# Patient Record
Sex: MALE | Race: BLACK OR AFRICAN AMERICAN | NOT HISPANIC OR LATINO | Employment: OTHER | URBAN - METROPOLITAN AREA
[De-identification: names, ages, dates, MRNs, and addresses within clinical notes are randomized per-mention and may not be internally consistent; named-entity substitution may affect disease eponyms.]

---

## 2017-03-19 ENCOUNTER — HOSPITAL ENCOUNTER (EMERGENCY)
Facility: OTHER | Age: 32
Discharge: HOME | End: 2017-03-20
Attending: EMERGENCY MEDICINE
Payer: COMMERCIAL

## 2017-03-19 DIAGNOSIS — M62.838 MUSCLE SPASMS OF NECK: ICD-10-CM
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
## 2017-03-19 DIAGNOSIS — T14.8XXA ABRASION: ICD-10-CM

## 2017-03-19 DIAGNOSIS — M62.830 MUSCLE SPASM OF BACK: Primary | ICD-10-CM

## 2017-03-19 PROCEDURE — 96372 THER/PROPH/DIAG INJ SC/IM: CPT

## 2017-03-19 PROCEDURE — 63600175 PHARM REV CODE 636 W HCPCS: Performed by: PHYSICIAN ASSISTANT

## 2017-03-19 PROCEDURE — 25000003 PHARM REV CODE 250: Performed by: PHYSICIAN ASSISTANT

## 2017-03-19 PROCEDURE — 99283 EMERGENCY DEPT VISIT LOW MDM: CPT | Mod: 25

## 2017-03-19 RX ORDER — KETOROLAC TROMETHAMINE 30 MG/ML
30 INJECTION, SOLUTION INTRAMUSCULAR; INTRAVENOUS
Status: COMPLETED | OUTPATIENT
Start: 2017-03-19 | End: 2017-03-19

## 2017-03-19 RX ORDER — DIAZEPAM 5 MG/1
5 TABLET ORAL EVERY 6 HOURS PRN
Qty: 20 TABLET | Refills: 0 | Status: SHIPPED | OUTPATIENT
Start: 2017-03-19 | End: 2017-05-05

## 2017-03-19 RX ORDER — DICLOFENAC SODIUM 25 MG/1
25 TABLET, DELAYED RELEASE ORAL 3 TIMES DAILY PRN
Qty: 30 TABLET | Refills: 0 | Status: SHIPPED | OUTPATIENT
Start: 2017-03-19 | End: 2017-05-05

## 2017-03-19 RX ORDER — DIAZEPAM 5 MG/1
5 TABLET ORAL
Status: COMPLETED | OUTPATIENT
Start: 2017-03-19 | End: 2017-03-19

## 2017-03-19 RX ADMIN — DIAZEPAM 5 MG: 5 TABLET ORAL at 11:03

## 2017-03-19 RX ADMIN — KETOROLAC TROMETHAMINE 30 MG: 30 INJECTION, SOLUTION INTRAMUSCULAR at 11:03
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
## 2017-03-20 VITALS
OXYGEN SATURATION: 97 % | TEMPERATURE: 98 F | DIASTOLIC BLOOD PRESSURE: 68 MMHG | HEIGHT: 70 IN | RESPIRATION RATE: 17 BRPM | HEART RATE: 74 BPM | SYSTOLIC BLOOD PRESSURE: 111 MMHG | WEIGHT: 230 LBS | BODY MASS INDEX: 32.93 KG/M2
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
## 2017-05-05 ENCOUNTER — HOSPITAL ENCOUNTER (EMERGENCY)
Facility: OTHER | Age: 32
Discharge: HOME | End: 2017-05-05
Attending: EMERGENCY MEDICINE
Payer: COMMERCIAL

## 2017-05-05 VITALS
HEART RATE: 75 BPM | OXYGEN SATURATION: 98 % | BODY MASS INDEX: 29.86 KG/M2 | SYSTOLIC BLOOD PRESSURE: 139 MMHG | RESPIRATION RATE: 14 BRPM | TEMPERATURE: 98 F | HEIGHT: 68 IN | DIASTOLIC BLOOD PRESSURE: 71 MMHG | WEIGHT: 197 LBS

## 2017-05-05 DIAGNOSIS — R10.84 GENERALIZED ABDOMINAL PAIN: ICD-10-CM

## 2017-05-05 DIAGNOSIS — R19.7 NAUSEA VOMITING AND DIARRHEA: Primary | ICD-10-CM

## 2017-05-05 DIAGNOSIS — R11.2 NAUSEA VOMITING AND DIARRHEA: Primary | ICD-10-CM

## 2017-05-05 LAB
ALBUMIN SERPL BCP-MCNC: 4.2 G/DL
ALP SERPL-CCNC: 59 U/L
ALT SERPL W/O P-5'-P-CCNC: 45 U/L
ANION GAP SERPL CALC-SCNC: 8 MMOL/L
AST SERPL-CCNC: 31 U/L
BACTERIA #/AREA URNS HPF: ABNORMAL /HPF
BASOPHILS # BLD AUTO: 0.01 K/UL
BASOPHILS NFR BLD: 0.1 %
BILIRUB SERPL-MCNC: 0.7 MG/DL
BILIRUB UR QL STRIP: ABNORMAL
BUN SERPL-MCNC: 9 MG/DL
CALCIUM SERPL-MCNC: 9.1 MG/DL
CHLORIDE SERPL-SCNC: 104 MMOL/L
CLARITY UR: CLEAR
CO2 SERPL-SCNC: 28 MMOL/L
COLOR UR: ABNORMAL
CREAT SERPL-MCNC: 0.9 MG/DL
DIFFERENTIAL METHOD: ABNORMAL
EOSINOPHIL # BLD AUTO: 0 K/UL
EOSINOPHIL NFR BLD: 0.1 %
ERYTHROCYTE [DISTWIDTH] IN BLOOD BY AUTOMATED COUNT: 13.7 %
EST. GFR  (AFRICAN AMERICAN): >60 ML/MIN/1.73 M^2
EST. GFR  (NON AFRICAN AMERICAN): >60 ML/MIN/1.73 M^2
GLUCOSE SERPL-MCNC: 136 MG/DL
GLUCOSE UR QL STRIP: NEGATIVE
HCT VFR BLD AUTO: 50 %
HGB BLD-MCNC: 16.9 G/DL
HGB UR QL STRIP: NEGATIVE
HYALINE CASTS #/AREA URNS LPF: 1 /LPF
KETONES UR QL STRIP: ABNORMAL
LEUKOCYTE ESTERASE UR QL STRIP: NEGATIVE
LIPASE SERPL-CCNC: 11 U/L
LYMPHOCYTES # BLD AUTO: 1.5 K/UL
LYMPHOCYTES NFR BLD: 19.6 %
MCH RBC QN AUTO: 31.8 PG
MCHC RBC AUTO-ENTMCNC: 33.8 %
MCV RBC AUTO: 94 FL
MICROSCOPIC COMMENT: ABNORMAL
MONOCYTES # BLD AUTO: 0.5 K/UL
MONOCYTES NFR BLD: 6 %
NEUTROPHILS # BLD AUTO: 5.6 K/UL
NEUTROPHILS NFR BLD: 74.1 %
NITRITE UR QL STRIP: NEGATIVE
PH UR STRIP: 6 [PH] (ref 5–8)
PLATELET # BLD AUTO: 130 K/UL
PMV BLD AUTO: 11.9 FL
POTASSIUM SERPL-SCNC: 3.8 MMOL/L
PROT SERPL-MCNC: 7.5 G/DL
PROT UR QL STRIP: ABNORMAL
RBC # BLD AUTO: 5.31 M/UL
RBC #/AREA URNS HPF: 2 /HPF (ref 0–4)
SODIUM SERPL-SCNC: 140 MMOL/L
SP GR UR STRIP: >=1.03 (ref 1–1.03)
SQUAMOUS #/AREA URNS HPF: 2 /HPF
URN SPEC COLLECT METH UR: ABNORMAL
UROBILINOGEN UR STRIP-ACNC: ABNORMAL EU/DL
WBC # BLD AUTO: 7.62 K/UL
WBC #/AREA URNS HPF: 5 /HPF (ref 0–5)
WBC CASTS #/AREA URNS LPF: 1 /LPF

## 2017-05-05 PROCEDURE — 85025 COMPLETE CBC W/AUTO DIFF WBC: CPT

## 2017-05-05 PROCEDURE — 63600175 PHARM REV CODE 636 W HCPCS: Performed by: PHYSICIAN ASSISTANT

## 2017-05-05 PROCEDURE — 96374 THER/PROPH/DIAG INJ IV PUSH: CPT

## 2017-05-05 PROCEDURE — 80053 COMPREHEN METABOLIC PANEL: CPT

## 2017-05-05 PROCEDURE — 81000 URINALYSIS NONAUTO W/SCOPE: CPT

## 2017-05-05 PROCEDURE — 83690 ASSAY OF LIPASE: CPT

## 2017-05-05 PROCEDURE — 96375 TX/PRO/DX INJ NEW DRUG ADDON: CPT

## 2017-05-05 PROCEDURE — 99284 EMERGENCY DEPT VISIT MOD MDM: CPT | Mod: 25

## 2017-05-05 PROCEDURE — 25000003 PHARM REV CODE 250: Performed by: PHYSICIAN ASSISTANT

## 2017-05-05 RX ORDER — FAMOTIDINE 10 MG/ML
20 INJECTION INTRAVENOUS
Status: COMPLETED | OUTPATIENT
Start: 2017-05-05 | End: 2017-05-05

## 2017-05-05 RX ORDER — ONDANSETRON 2 MG/ML
4 INJECTION INTRAMUSCULAR; INTRAVENOUS
Status: COMPLETED | OUTPATIENT
Start: 2017-05-05 | End: 2017-05-05

## 2017-05-05 RX ORDER — FAMOTIDINE 20 MG/1
20 TABLET, FILM COATED ORAL 2 TIMES DAILY
Qty: 20 TABLET | Refills: 0 | Status: SHIPPED | OUTPATIENT
Start: 2017-05-05 | End: 2017-05-17

## 2017-05-05 RX ORDER — ONDANSETRON 4 MG/1
4 TABLET, ORALLY DISINTEGRATING ORAL EVERY 8 HOURS PRN
Qty: 20 TABLET | Refills: 0 | Status: SHIPPED | OUTPATIENT
Start: 2017-05-05 | End: 2017-05-17

## 2017-05-05 RX ADMIN — FAMOTIDINE 20 MG: 10 INJECTION, SOLUTION INTRAVENOUS at 03:05

## 2017-05-05 RX ADMIN — ONDANSETRON 4 MG: 2 INJECTION INTRAMUSCULAR; INTRAVENOUS at 03:05

## 2017-05-05 RX ADMIN — SODIUM CHLORIDE 1000 ML: 0.9 INJECTION, SOLUTION INTRAVENOUS at 03:05

## 2017-05-05 NOTE — ED AVS SNAPSHOT
[facility]  [address]  [facility] [address]               Ignacio Su   2017  2:37 PM   ED    Description:  Male : 1985   Department:  [facility]           Your Care was Coordinated By:     Provider Role From To    Brigido Peterson MD Attending Provider 17 0937 --    Demi Gutierrez PA-C Physician Assistant 17 4504 --      Reason for Visit     Abdominal Pain           Diagnoses this Visit        Comments    Nausea vomiting and diarrhea    -  Primary     Generalized abdominal pain           ED Disposition     None           To Do List           Follow-up Information     Follow up with [facility] - Internal [facility] In 3 days.    Specialty:  Internal Medicine    Contact information:    [address]  [facility] [address] [phone]    Additional information:    [facility], 8th Floor, [address]   Please park in [facility] Parking Garage.       These Medications        Disp Refills Start End    ondansetron (ZOFRAN-ODT) 4 MG TbDL 20 tablet 0 2017     Take 1 tablet (4 mg total) by mouth every 8 (eight) hours as needed (nausea). - Oral    famotidine (PEPCID) 20 MG tablet 20 tablet 0 2017    Take 1 tablet (20 mg total) by mouth 2 (two) times daily. - Oral      [facility]     [facility] Care [facility] - 24/7 Assistance  Unless otherwise directed by your provider, please contact [facility], our nurse care line that is available for 24/7 assistance.     Registered nurses in the [facility] provide: appointment scheduling, clinical advisement, health education, and other advisory services.  Call: [phone] (toll free)               Medications           Message regarding Medications     Verify the changes and/or additions to your medication regime listed below are the same as discussed with your clinician today.  If any of these changes or additions are  incorrect, please notify your healthcare provider.        START taking these NEW medications        Refills    ondansetron (ZOFRAN-ODT) 4 MG TbDL 0    Sig: Take 1 tablet (4 mg total) by mouth every 8 (eight) hours as needed (nausea).    Class: Print    Route: Oral    famotidine (PEPCID) 20 MG tablet 0    Sig: Take 1 tablet (20 mg total) by mouth 2 (two) times daily.    Class: Print    Route: Oral      These medications were administered today        Dose Freq    sodium chloride 0.9% bolus 1,000 mL 1,000 mL ED 1 Time    Sig: Inject 1,000 mLs into the vein ED 1 Time.    Class: Normal    Route: Intravenous    famotidine (PF) 20 mg/2 mL injection 20 mg 20 mg ED 1 Time    Sig: Inject 2 mLs (20 mg total) into the vein ED 1 Time.    Class: Normal    Route: Intravenous    ondansetron injection 4 mg 4 mg ED 1 Time    Sig: Inject 4 mg into the vein ED 1 Time.    Class: Normal    Route: Intravenous      STOP taking these medications     diazePAM (VALIUM) 5 MG tablet Take 1 tablet (5 mg total) by mouth every 6 (six) hours as needed (neck and back pain).    diclofenac (VOLTAREN) 25 MG TbEC Take 1 tablet (25 mg total) by mouth 3 (three) times daily as needed (pain).           Verify that the below list of medications is an accurate representation of the medications you are currently taking.  If none reported, the list may be blank. If incorrect, please contact your healthcare provider. Carry this list with you in case of emergency.           Current Medications     famotidine (PEPCID) 20 MG tablet Take 1 tablet (20 mg total) by mouth 2 (two) times daily.    ondansetron (ZOFRAN-ODT) 4 MG TbDL Take 1 tablet (4 mg total) by mouth every 8 (eight) hours as needed (nausea).    ranitidine (ZANTAC) 150 MG capsule Take 150 mg by mouth 2 (two) times daily.           Clinical Reference Information           Your Vitals Were     BP Pulse Temp Resp Height Weight    114/69 (BP Location: Left arm, Patient Position: Sitting) 72 97.5 °F (36.4 °C)  "(Oral) 14 5' 8" (1.727 m) 89.4 kg (197 lb)    SpO2 BMI             96% 29.95 kg/m2         Allergies as of 5/5/2017     No Known Allergies      Immunizations Administered on Date of Encounter - 5/5/2017     None      ED Micro, Lab, POCT     Start Ordered       Status Ordering Provider    05/05/17 1444 05/05/17 1443  CBC auto differential  STAT      Final result     05/05/17 1444 05/05/17 1443  Comprehensive metabolic panel  STAT      Final result     05/05/17 1444 05/05/17 1443  Lipase  STAT      Final result     05/05/17 1444 05/05/17 1443  Urinalysis  STAT      Final result     05/05/17 1443 05/05/17 1443  Urinalysis Microscopic  Once      Final result       ED Imaging Orders     None      Discharge References/Attachments     ABDOMINAL PAIN, ADULT (ENGLISH)    NAUSEA AND VOMITING, HOW TO CONTROL (ENGLISH)    VOMITING AND DIARRHEA, SELF-CARE FOR (ENGLISH)      [facility] Sign-Up     Activating your [facility] account is as easy as 1-2-3!     1) Visit [email or web address], select Sign Up Now, enter this activation code and your date of birth, then select Next.  [email or web address]  Expires: 6/19/2017  4:02 PM      2) Create a username and password to use when you visit [facility] in the future and select a security question in case you lose your password and select Next.    3) Enter your e-mail address and click Sign Up!    Additional Information  If you have questions, please e-mail [email or web address] or call [phone] to talk to our [facility] staff. Remember, [facility] is NOT to be used for urgent needs. For medical emergencies, dial 911.          [facility] complies with applicable Federal civil rights laws and does not discriminate on the basis of race, color, national origin, age, disability, or sex.        Language Assistance Services     ATTENTION: Language assistance services are available, free of charge. Please call [phone].      ATENCIÓN: Si habla español, tiene a banuelos disposición " servicios gratuitos de asistencia lingüística. Eleni al [phone].     JAYNE Ý: N?u b?n nói Ti?ng Vi?t, có các d?ch v? h? tr? ngôn ng? mi?n phí dành cho b?n. G?i s? [phone].

## 2017-05-05 NOTE — ED TRIAGE NOTES
"Pt c/o constant sharp mid quadrant abdominal pain. Pt states "It feel like somebody twistin my insides." Pt c/o N/V/D X 3 days. Pt unable to hold down sips of fluid.   "

## 2017-05-05 NOTE — ED PROVIDER NOTES
"Encounter Date: 5/5/2017       History     Chief Complaint   Patient presents with    Abdominal Pain     Patient c/o generalized abdominal pain with nausea, vomiting, diarrhea and fatigue for several days.  Patient stated, "Anything I eat or drink I throw up and im so hungry but I cant."       Review of patient's allergies indicates:  No Known Allergies  HPI Comments: 31-year-old male with GERD and internal hemorrhoids presents to the emergency department with complaints of nausea, vomiting, diarrhea and abdominal pain.  He states his symptoms and present for last 3 days.  He reports multiple episodes of emesis and diarrhea.  He states that he now feels fatigued.  He complains of 10 out of 10 pain that is cramping in nature.  No current treatment.  He does admit to working at [employer] and has been exposed to multiple sick students    The history is provided by the patient and the spouse.     Past Medical History:   Diagnosis Date    GERD (gastroesophageal reflux disease)     Internal hemorrhoids      History reviewed. No pertinent surgical history.  Family History   Problem Relation Age of Onset    Heart disease Mother     Heart disease Father      Social History   Substance Use Topics    Smoking status: Never Smoker    Smokeless tobacco: None    Alcohol use Yes     Review of Systems   Constitutional: Positive for fatigue. Negative for chills and fever.   HENT: Negative for sore throat.    Respiratory: Negative for shortness of breath.    Cardiovascular: Negative for chest pain.   Gastrointestinal: Positive for abdominal pain, diarrhea, nausea and vomiting.   Genitourinary: Negative for difficulty urinating, dysuria, flank pain, frequency and urgency.   Musculoskeletal: Negative for back pain.   Skin: Negative for rash.   Neurological: Negative for weakness.   Hematological: Does not bruise/bleed easily.       Physical Exam   Initial Vitals   BP Pulse Resp Temp SpO2   05/05/17 1434 05/05/17 1434 05/05/17 1434 " 05/05/17 1434 05/05/17 1434   114/69 72 14 97.5 °F (36.4 °C) 96 %     Physical Exam    Nursing note and vitals reviewed.  Constitutional: Vital signs are normal. He appears well-developed and well-nourished. He is not diaphoretic.  Non-toxic appearance. No distress.   HENT:   Head: Normocephalic and atraumatic.   Right Ear: External ear normal.   Left Ear: External ear normal.   Nose: Nose normal.   Mouth/Throat: Uvula is midline and oropharynx is clear and moist. Mucous membranes are dry. No trismus in the jaw. No uvula swelling. No oropharyngeal exudate.   Eyes: Conjunctivae, EOM and lids are normal. Pupils are equal, round, and reactive to light. No scleral icterus.   Neck: Normal range of motion and phonation normal. Neck supple.   Cardiovascular: Normal rate, regular rhythm and normal heart sounds. Exam reveals no gallop and no friction rub.    No murmur heard.  Pulmonary/Chest: Effort normal and breath sounds normal. No respiratory distress. He has no decreased breath sounds. He has no wheezes. He has no rhonchi. He has no rales.   Abdominal: Soft. Normal appearance and bowel sounds are normal. There is generalized tenderness. There is no rebound and no guarding.   Musculoskeletal: Normal range of motion.   No obvious deformities, moving all extremities, normal gait   Neurological: He is alert and oriented to person, place, and time. He has normal strength and normal reflexes. No sensory deficit.   Skin: Skin is warm, dry and intact. No lesion and no rash noted. No erythema.   Psychiatric: He has a normal mood and affect. His speech is normal and behavior is normal. Judgment normal. Cognition and memory are normal.         ED Course   Procedures  Labs Reviewed   CBC W/ AUTO DIFFERENTIAL - Abnormal; Notable for the following:        Result Value    MCH 31.8 (*)     Platelets 130 (*)     Gran% 74.1 (*)     All other components within normal limits   COMPREHENSIVE METABOLIC PANEL - Abnormal; Notable for the  following:     Glucose 136 (*)     ALT 45 (*)     All other components within normal limits   URINALYSIS - Abnormal; Notable for the following:     Specific Gravity, UA >=1.030 (*)     Protein, UA 1+ (*)     Ketones, UA Trace (*)     Bilirubin (UA) 1+ (*)     Urobilinogen, UA 2.0-3.0 (*)     All other components within normal limits   URINALYSIS MICROSCOPIC - Abnormal; Notable for the following:     WBC Casts, UA 1 (*)     All other components within normal limits   LIPASE             Medical Decision Making:   History:   I obtained history from: someone other than patient.       <> Summary of History: spouse  Old Medical Records: I decided to obtain old medical records.  Initial Assessment:   31-year-old male with complaints consistent nausea, vomiting and diarrhea with associated abdominal pain likely secondary to viral gastroenteritis.  Vital signs stable, afebrile, neurovascularly intact.  He is alert, healthy and nontoxic appearing.  He is in no apparent distress.  Exam documented above.  Clinical Tests:   Lab Tests: Ordered and Reviewed  The following lab test(s) were unremarkable: CBC, CMP, Lipase and Urinalysis  ED Management:  Urine and labs were obtained.  No significant abnormality to explain patient's symptoms.  Do believe that his viral in nature.  He was administered IV fluids, Zofran and Pepcid in the emergency department.3:59 PM patient states he is feeling much improved.  Tolerating by mouth water.  We'll discharged home with prescriptions for symptomatic treatment and care instructions.  He is to follow-up with his primary care physician in the next 48 hours or return for any worsening symptoms.  He states understanding.  This patient was discussed with the attending physician who agrees with treatment plan.   Other:   I have discussed this case with another health care provider.       <> Summary of the Discussion: Nicholas  This note was created using Dragon Medical dictation.  There may be  typographical errors secondary to dictation.                     ED Course     Clinical Impression:     1. Nausea vomiting and diarrhea    2. Generalized abdominal pain        Disposition:   Disposition: Discharged  Condition: Stable       Demi Gutierrez PA-C  05/05/17 1600

## 2017-05-05 NOTE — ED NOTES
Patient Identifiers for Ignacio Su checked and correct  LOC: The patient is awake, alert and aware of environment with an appropriate affect, the patient is oriented x 3 and speaking appropriate.  APPEARANCE: Patient resting comfortably and in no acute distress. The patient is clean and well groomed. The patient's clothing is properly fastened.  SKIN: The skin is warm and dry. The patient has normal skin turgor and dry mucus membranes.   Musculoskeletal :  Normal range of motion noted. Moves all extremities well.  RESPIRATORY: Airway is open and patent, respirations are spontaneous, patient has a normal effort and rate. Breath sounds are clear & equal, bilaterally.  CARDIAC: Patient has a normal rate and rhythm, no peripheral edema noted, capillary refill < 3 seconds.   ABDOMEN: Soft and tender to palpation in the epigastrium, RUQ & RLQ. Pain most severe upon palpation of the RLQ. no distention observed. Bowels Sounds are WNL all quads.  PULSES: 2+ radial pulses, symmetrical in all extremities.        Will continue to monitor

## 2017-05-17 ENCOUNTER — HOSPITAL ENCOUNTER (EMERGENCY)
Facility: OTHER | Age: 32
Discharge: HOME | End: 2017-05-17
Attending: EMERGENCY MEDICINE
Payer: COMMERCIAL

## 2017-05-17 VITALS
OXYGEN SATURATION: 97 % | HEIGHT: 71 IN | HEART RATE: 86 BPM | RESPIRATION RATE: 18 BRPM | SYSTOLIC BLOOD PRESSURE: 132 MMHG | WEIGHT: 200 LBS | DIASTOLIC BLOOD PRESSURE: 69 MMHG | TEMPERATURE: 98 F | BODY MASS INDEX: 28 KG/M2

## 2017-05-17 DIAGNOSIS — H10.31 ACUTE CONJUNCTIVITIS OF RIGHT EYE, UNSPECIFIED ACUTE CONJUNCTIVITIS TYPE: Primary | ICD-10-CM

## 2017-05-17 PROCEDURE — 99283 EMERGENCY DEPT VISIT LOW MDM: CPT

## 2017-05-17 RX ORDER — ERYTHROMYCIN 5 MG/G
OINTMENT OPHTHALMIC
Qty: 1 TUBE | Refills: 0 | Status: SHIPPED | OUTPATIENT
Start: 2017-05-17 | End: 2017-06-01

## 2017-05-17 NOTE — ED AVS SNAPSHOT
[facility]  [address] [facility] [facility] [address]               Ignacio Su   2017  9:26 AM   ED    Description:  Male : 1985   Department:  [facility]           Your Care was Coordinated By:     Provider Role From To    Adrien Servin MD Attending Provider 17 0932 --    Vangie Eldre PA-C Physician Assistant 1732 --      Reason for Visit     Conjunctivitis           Diagnoses this Visit        Comments    Acute conjunctivitis of right eye, unspecified acute conjunctivitis type    -  Primary       ED Disposition     None           To Do List           Follow-up Information     Follow up with [facility] Naomi Santiago In 2 days.    Contact information:    [address] [facility] [address] [phone]          Follow up with [facility].    Specialty:  Emergency Medicine    Why:  If symptoms worsen    Contact information:    [address] [facility] [address] [phone]       These Medications        Disp Refills Start End    erythromycin (ROMYCIN) ophthalmic ointment 1 Tube 0 2017     Place a 1/2 inch ribbon of ointment into the lower eyelid 4-5 times per day x7 days    Pharmacy: [facility] [address] [place] [address] AT [facility] #: [phone]         [facility]     [facility] Nurse Care Line - 24/ Assistance  Unless otherwise directed by your provider, please contact [facility], our nurse care line that is available for 24/7 assistance.     Registered nurses in the [facility] provide: appointment scheduling, clinical advisement, health education, and other advisory services.  Call: [phone] (toll free)               Medications           Message regarding Medications     Verify the changes and/or additions to your medication regime listed below are the same as  "discussed with your clinician today.  If any of these changes or additions are incorrect, please notify your healthcare provider.        START taking these NEW medications        Refills    erythromycin (ROMYCIN) ophthalmic ointment 0    Sig: Place a 1/2 inch ribbon of ointment into the lower eyelid 4-5 times per day x7 days    Class: Print      STOP taking these medications     ondansetron (ZOFRAN-ODT) 4 MG TbDL Take 1 tablet (4 mg total) by mouth every 8 (eight) hours as needed (nausea).    famotidine (PEPCID) 20 MG tablet Take 1 tablet (20 mg total) by mouth 2 (two) times daily.           Verify that the below list of medications is an accurate representation of the medications you are currently taking.  If none reported, the list may be blank. If incorrect, please contact your healthcare provider. Carry this list with you in case of emergency.           Current Medications     ranitidine (ZANTAC) 150 MG capsule Take 150 mg by mouth 2 (two) times daily.    erythromycin (ROMYCIN) ophthalmic ointment Place a 1/2 inch ribbon of ointment into the lower eyelid 4-5 times per day x7 days           Clinical Reference Information           Your Vitals Were     BP Pulse Temp Resp Height Weight    132/69 (BP Location: Left arm, Patient Position: Sitting) 86 97.9 °F (36.6 °C) (Oral) 18 5' 11" (1.803 m) 90.7 kg (200 lb)    SpO2 BMI             97% 27.89 kg/m2         Allergies as of 5/17/2017     No Known Allergies      Immunizations Administered on Date of Encounter - 5/17/2017     None      ED Micro, Lab, POCT     None      ED Imaging Orders     None      Discharge References/Attachments     CONJUNCTIVITIS, NON-SPECIFIC (ENGLISH)      [facility] Sign-Up     Activating your [facility] account is as easy as 1-2-3!     1) Visit [email or web address], select Sign Up Now, enter this activation code and your date of birth, then select Next.  [email or web address]  Expires: 6/19/2017  4:02 PM      2) Create a username and password to use when " you visit [facility] in the future and select a security question in case you lose your password and select Next.    3) Enter your e-mail address and click Sign Up!    Additional Information  If you have questions, please e-mail [email or web address] or call [phone] to talk to our [facility] staff. Remember, [facility] is NOT to be used for urgent needs. For medical emergencies, dial 911.          [facility] complies with applicable Federal civil rights laws and does not discriminate on the basis of race, color, national origin, age, disability, or sex.        Language Assistance Services     ATTENTION: Language assistance services are available, free of charge. Please call [phone].      ATENCIÓN: Si rosaliela xu, tiene a banuelos disposición servicios gratuitos de asistencia lingüística. Llame al [phone].     CHÚ Ý: N?u b?n nói Ti?ng Vi?t, có các d?ch v? h? tr? ngôn ng? mi?n phí dành cho b?n. G?i s? [phone].

## 2017-05-17 NOTE — ED PROVIDER NOTES
"Encounter Date: 5/17/2017       History     Chief Complaint   Patient presents with    Conjunctivitis     pt reports "It's the third day that I've woken up with my right eye crusted over." c/o right eye redness, swelling, drainage and crusting for the past 3 days     Review of patient's allergies indicates:  No Known Allergies  HPI Comments: Patient is a 31 y.o. male presenting to the emergency department with complaints of right eye pain and discharge.  The patient reports that for the past 3 days, he is woken with crusty discharge to his right eye.  He also reports 8/10 pain and itching.  He denies blurred vision or changes in his vision.  He denies other symptoms including fever, chills, nausea, vomiting.  He has not used any medication for symptoms thus far.  He denies previous episode.  He states he has not wear contacts.    The history is provided by the patient.     Past Medical History:   Diagnosis Date    GERD (gastroesophageal reflux disease)     Internal hemorrhoids      History reviewed. No pertinent surgical history.  Family History   Problem Relation Age of Onset    Heart disease Mother     Heart disease Father      Social History   Substance Use Topics    Smoking status: Never Smoker    Smokeless tobacco: None    Alcohol use Yes     Review of Systems   Constitutional: Negative for activity change, chills, fatigue and fever.   HENT: Negative for congestion, rhinorrhea and sore throat.    Eyes: Positive for discharge, redness and itching. Negative for photophobia and visual disturbance.   Respiratory: Negative for cough and shortness of breath.    Cardiovascular: Negative for chest pain.   Gastrointestinal: Negative for abdominal pain, diarrhea, nausea and vomiting.   Genitourinary: Negative for dysuria, hematuria and urgency.   Musculoskeletal: Negative for back pain, myalgias and neck pain.   Skin: Negative for color change and wound.   Neurological: Negative for weakness and headaches. "   Psychiatric/Behavioral: Negative for agitation and confusion.       Physical Exam   Initial Vitals   BP Pulse Resp Temp SpO2   05/17/17 0854 05/17/17 0854 05/17/17 0854 05/17/17 0854 05/17/17 0854   132/69 86 18 97.9 °F (36.6 °C) 97 %     Physical Exam    Nursing note and vitals reviewed.  Constitutional: Vital signs are normal. He appears well-developed and well-nourished. He is not diaphoretic. He is cooperative.  Non-toxic appearance. He does not have a sickly appearance. He does not appear ill. No distress.   Well appearing, -American male unaccompanied in the emergency department.  He is in no acute distress, laying comfortably on the exam table listening to music.   HENT:   Head: Normocephalic and atraumatic.   Right Ear: Hearing, tympanic membrane, external ear and ear canal normal.   Left Ear: Hearing, tympanic membrane, external ear and ear canal normal.   Nose: Nose normal.   Mouth/Throat: Oropharynx is clear and moist.   Eyes: EOM and lids are normal. Pupils are equal, round, and reactive to light.   Right-sided conjunctivitis with no active purulent drainage.  Normal extraocular movement.  PERRLA.  No edema or tenderness palpation of upper or lower lids.  No surrounding tenderness orbit.   Neck: Normal range of motion. Neck supple.   Cardiovascular: Normal rate, regular rhythm and normal heart sounds.   Pulmonary/Chest: Breath sounds normal. No respiratory distress. He has no wheezes.   Abdominal: Soft. Bowel sounds are normal. He exhibits no distension. There is no tenderness. There is no rebound and no guarding.   Musculoskeletal: Normal range of motion.   Neurological: He is alert and oriented to person, place, and time. GCS eye subscore is 4. GCS verbal subscore is 5. GCS motor subscore is 6.   Skin: Skin is warm.   Psychiatric: He has a normal mood and affect. His behavior is normal. Judgment and thought content normal.         ED Course   Procedures  Labs Reviewed - No data to display           Medical Decision Making:   Other:   I have discussed this case with another health care provider.       <> Summary of the Discussion: Dr. Bueno  This note was created using Dragon Medical Dictation. There may be typographical errors secondary to dictation.     Urgent evaluation of a 31 y.o. male presenting to the emergency department complaining of right eye drainage and pain. Patient is afebrile, nontoxic appearing and hemodynamically stable.  Physical exam reveals regular rate and rhythm, lungs are clear to auscultation bilaterally.  Right-sided conjunctivitis with no surrounding edema, erythema, or tenderness to palpation.  Normal extraocular movement.  Visual acuity is assessed at 20/20 in bilateral eyes as well as the left and right eye individually.  Given patient's history and physical exam findings, I will treat with erythromycin.  No further testing or imaging is warranted.  Patient was counseled on symptomatic care and treatment.  He stable for discharge. The patient was instructed to follow up with a primary care provider in 2 days or to return to the emergency department for worsening symptoms. The treatment plan was discussed with the patient who demonstrated understanding and comfort with plan. The patient's history, physical exam, and plan of care was discussed with and agreed upon with my supervising physician.     Past Medical History:   Diagnosis Date    GERD (gastroesophageal reflux disease)     Internal hemorrhoids                      ED Course     Clinical Impression:     1. Acute conjunctivitis of right eye, unspecified acute conjunctivitis type       Disposition:   Disposition: Discharged  Condition: Stable       Vangie Elder PA-C  05/17/17 0948

## 2017-06-01 ENCOUNTER — HOSPITAL ENCOUNTER (EMERGENCY)
Facility: OTHER | Age: 32
Discharge: HOME | End: 2017-06-01
Attending: EMERGENCY MEDICINE
Payer: COMMERCIAL

## 2017-06-01 VITALS
OXYGEN SATURATION: 96 % | RESPIRATION RATE: 20 BRPM | HEART RATE: 82 BPM | HEIGHT: 71 IN | DIASTOLIC BLOOD PRESSURE: 78 MMHG | BODY MASS INDEX: 28 KG/M2 | WEIGHT: 200 LBS | TEMPERATURE: 98 F | SYSTOLIC BLOOD PRESSURE: 137 MMHG

## 2017-06-01 DIAGNOSIS — K83.8 DILATED CBD, ACQUIRED: Primary | ICD-10-CM

## 2017-06-01 DIAGNOSIS — R74.8 ELEVATED LIPASE: ICD-10-CM

## 2017-06-01 DIAGNOSIS — R11.2 NAUSEA AND VOMITING, INTRACTABILITY OF VOMITING NOT SPECIFIED, UNSPECIFIED VOMITING TYPE: ICD-10-CM

## 2017-06-01 LAB
ALBUMIN SERPL BCP-MCNC: 3.5 G/DL
ALP SERPL-CCNC: 68 U/L
ALT SERPL W/O P-5'-P-CCNC: 50 U/L
ANION GAP SERPL CALC-SCNC: 7 MMOL/L
AST SERPL-CCNC: 36 U/L
BASOPHILS # BLD AUTO: 0.03 K/UL
BASOPHILS NFR BLD: 0.5 %
BILIRUB SERPL-MCNC: 0.3 MG/DL
BILIRUB UR QL STRIP: NEGATIVE
BUN SERPL-MCNC: 6 MG/DL
CALCIUM SERPL-MCNC: 8.7 MG/DL
CHLORIDE SERPL-SCNC: 111 MMOL/L
CLARITY UR: CLEAR
CO2 SERPL-SCNC: 25 MMOL/L
COLOR UR: YELLOW
CREAT SERPL-MCNC: 0.8 MG/DL
DIFFERENTIAL METHOD: ABNORMAL
EOSINOPHIL # BLD AUTO: 0 K/UL
EOSINOPHIL NFR BLD: 0.6 %
ERYTHROCYTE [DISTWIDTH] IN BLOOD BY AUTOMATED COUNT: 14.3 %
EST. GFR  (AFRICAN AMERICAN): >60 ML/MIN/1.73 M^2
EST. GFR  (NON AFRICAN AMERICAN): >60 ML/MIN/1.73 M^2
GLUCOSE SERPL-MCNC: 104 MG/DL
GLUCOSE UR QL STRIP: NEGATIVE
HCT VFR BLD AUTO: 45.3 %
HGB BLD-MCNC: 14.8 G/DL
HGB UR QL STRIP: NEGATIVE
KETONES UR QL STRIP: NEGATIVE
LEUKOCYTE ESTERASE UR QL STRIP: NEGATIVE
LIPASE SERPL-CCNC: 91 U/L
LYMPHOCYTES # BLD AUTO: 2.1 K/UL
LYMPHOCYTES NFR BLD: 32.9 %
MCH RBC QN AUTO: 31.4 PG
MCHC RBC AUTO-ENTMCNC: 32.7 %
MCV RBC AUTO: 96 FL
MONOCYTES # BLD AUTO: 0.6 K/UL
MONOCYTES NFR BLD: 9.1 %
NEUTROPHILS # BLD AUTO: 3.7 K/UL
NEUTROPHILS NFR BLD: 56.3 %
NITRITE UR QL STRIP: NEGATIVE
PH UR STRIP: 8 [PH] (ref 5–8)
PLATELET # BLD AUTO: 157 K/UL
PMV BLD AUTO: 10.8 FL
POTASSIUM SERPL-SCNC: 4.2 MMOL/L
PROT SERPL-MCNC: 6.3 G/DL
PROT UR QL STRIP: ABNORMAL
RBC # BLD AUTO: 4.72 M/UL
SODIUM SERPL-SCNC: 143 MMOL/L
SP GR UR STRIP: 1.01 (ref 1–1.03)
URN SPEC COLLECT METH UR: ABNORMAL
UROBILINOGEN UR STRIP-ACNC: 1 EU/DL
WBC # BLD AUTO: 6.5 K/UL

## 2017-06-01 PROCEDURE — 83690 ASSAY OF LIPASE: CPT

## 2017-06-01 PROCEDURE — 96375 TX/PRO/DX INJ NEW DRUG ADDON: CPT

## 2017-06-01 PROCEDURE — 96374 THER/PROPH/DIAG INJ IV PUSH: CPT

## 2017-06-01 PROCEDURE — 25500020 PHARM REV CODE 255: Performed by: EMERGENCY MEDICINE

## 2017-06-01 PROCEDURE — 25000003 PHARM REV CODE 250: Performed by: PHYSICIAN ASSISTANT

## 2017-06-01 PROCEDURE — 81003 URINALYSIS AUTO W/O SCOPE: CPT

## 2017-06-01 PROCEDURE — 85025 COMPLETE CBC W/AUTO DIFF WBC: CPT

## 2017-06-01 PROCEDURE — 63600175 PHARM REV CODE 636 W HCPCS: Performed by: PHYSICIAN ASSISTANT

## 2017-06-01 PROCEDURE — 80053 COMPREHEN METABOLIC PANEL: CPT

## 2017-06-01 PROCEDURE — 99284 EMERGENCY DEPT VISIT MOD MDM: CPT | Mod: 25

## 2017-06-01 PROCEDURE — 96361 HYDRATE IV INFUSION ADD-ON: CPT

## 2017-06-01 RX ORDER — SODIUM CHLORIDE 9 MG/ML
1000 INJECTION, SOLUTION INTRAVENOUS
Status: COMPLETED | OUTPATIENT
Start: 2017-06-01 | End: 2017-06-01

## 2017-06-01 RX ORDER — ONDANSETRON 4 MG/1
4 TABLET, ORALLY DISINTEGRATING ORAL EVERY 8 HOURS PRN
Qty: 12 TABLET | Refills: 0 | Status: SHIPPED | OUTPATIENT
Start: 2017-06-01 | End: 2017-09-07

## 2017-06-01 RX ORDER — ONDANSETRON 2 MG/ML
4 INJECTION INTRAMUSCULAR; INTRAVENOUS
Status: COMPLETED | OUTPATIENT
Start: 2017-06-01 | End: 2017-06-01

## 2017-06-01 RX ORDER — FAMOTIDINE 20 MG/1
20 TABLET, FILM COATED ORAL 2 TIMES DAILY
Qty: 20 TABLET | Refills: 0 | Status: SHIPPED | OUTPATIENT
Start: 2017-06-01 | End: 2018-06-15

## 2017-06-01 RX ORDER — IBUPROFEN 600 MG/1
600 TABLET ORAL EVERY 6 HOURS PRN
Qty: 20 TABLET | Refills: 0 | Status: SHIPPED | OUTPATIENT
Start: 2017-06-01 | End: 2018-04-22

## 2017-06-01 RX ORDER — MORPHINE SULFATE 4 MG/ML
4 INJECTION, SOLUTION INTRAMUSCULAR; INTRAVENOUS
Status: COMPLETED | OUTPATIENT
Start: 2017-06-01 | End: 2017-06-01

## 2017-06-01 RX ORDER — FAMOTIDINE 10 MG/ML
20 INJECTION INTRAVENOUS
Status: COMPLETED | OUTPATIENT
Start: 2017-06-01 | End: 2017-06-01

## 2017-06-01 RX ADMIN — ONDANSETRON 4 MG: 2 INJECTION, SOLUTION INTRAMUSCULAR; INTRAVENOUS at 01:06

## 2017-06-01 RX ADMIN — MORPHINE SULFATE 4 MG: 4 INJECTION, SOLUTION INTRAMUSCULAR; INTRAVENOUS at 02:06

## 2017-06-01 RX ADMIN — IOHEXOL 100 ML: 350 INJECTION, SOLUTION INTRAVENOUS at 03:06

## 2017-06-01 RX ADMIN — FAMOTIDINE 20 MG: 10 INJECTION, SOLUTION INTRAVENOUS at 01:06

## 2017-06-01 RX ADMIN — SODIUM CHLORIDE 1000 ML: 0.9 INJECTION, SOLUTION INTRAVENOUS at 01:06

## 2017-06-01 NOTE — ED PROVIDER NOTES
Encounter Date: 6/1/2017       History     Chief Complaint   Patient presents with    Abdominal Pain     pt reports abdominal pain with N/V but denies any diarrhea; symptoms started last night     Review of patient's allergies indicates:  No Known Allergies  Patient is 32-year-old male who presents with complaints of upper abdominal pain with nausea and vomiting that started last night after eating a hot sausage sandwich.  He reports pain is constant described as a deep aching and sometimes sharp pain.  He had a normal bowel movement yesterday and denies dysuria.  He denies URI symptoms or fever.  He has not taken any medications to help with symptoms.  He denies chest pain, shortness of breath, hematuria hematemesis or blood per rectum.  No trauma or injury.Currently unaccompanied in the ER.      The history is provided by the patient.     Past Medical History:   Diagnosis Date    GERD (gastroesophageal reflux disease)     Hemorrhoids     Internal hemorrhoids      History reviewed. No pertinent surgical history.  Family History   Problem Relation Age of Onset    Heart disease Mother     Heart disease Father      Social History   Substance Use Topics    Smoking status: Never Smoker    Smokeless tobacco: Not on file    Alcohol use Yes     Review of Systems   Constitutional: Negative for fever.   HENT: Negative for sore throat.    Respiratory: Negative for shortness of breath.    Cardiovascular: Negative for chest pain.   Gastrointestinal: Positive for abdominal pain, nausea and vomiting.   Genitourinary: Negative for dysuria.   Musculoskeletal: Negative for back pain.   Skin: Negative for rash.   Neurological: Negative for weakness.   Hematological: Does not bruise/bleed easily.       Physical Exam     Initial Vitals [06/01/17 1253]   BP Pulse Resp Temp SpO2   124/77 88 20 98 °F (36.7 °C) 97 %     Physical Exam    Nursing note and vitals reviewed.  Constitutional: He appears well-developed and  well-nourished.   Healthy appearing female laying in supine position on exam stretcher.  He makes good eye contact, speaks in clear full sentences.  He does not ambulate during my exam.  There is no active vomiting during my exam.   HENT:   Head: Normocephalic and atraumatic.   Eyes: Conjunctivae and EOM are normal. Pupils are equal, round, and reactive to light.   Neck: Normal range of motion. Neck supple.   Cardiovascular: Normal rate, regular rhythm and normal heart sounds. Exam reveals no gallop and no friction rub.    No murmur heard.  Pulmonary/Chest: Breath sounds normal. He has no wheezes. He has no rhonchi. He has no rales.   Abdominal: Soft. Bowel sounds are normal. There is tenderness. There is no rebound and no guarding.   Right upper quadrant tenderness to palpation with positive Loya sign.  There is no overlying skin changes.  There is normal bowel sounds in all 4 quadrants.  There is no CVA tenderness to percussion.   Musculoskeletal: Normal range of motion. He exhibits no edema or tenderness.   Lymphadenopathy:     He has no cervical adenopathy.   Neurological: He is alert and oriented to person, place, and time. He has normal strength. No cranial nerve deficit or sensory deficit.   Skin: Skin is warm and dry. Capillary refill takes less than 2 seconds. No rash and no abscess noted. No erythema.   Psychiatric: He has a normal mood and affect. His behavior is normal. Thought content normal.         ED Course   Procedures  Labs Reviewed   CBC W/ AUTO DIFFERENTIAL - Abnormal; Notable for the following:        Result Value    MCH 31.4 (*)     All other components within normal limits   COMPREHENSIVE METABOLIC PANEL   URINALYSIS         Imaging Results          CT Abdomen Pelvis With Contrast (Final result)  Result time 06/01/17 16:20:49    Final result by Sung Pardo MD (06/01/17 16:20:49)                 Impression:        1. No acute intra-abdominal process.    2. Mild, chronic dilatation of  common bile duct at 9-10 mm without obvious cause.    3. Fatty liver and other findings as above.      Electronically signed by: MARTIN CASTANEDA MD  Date:     06/01/17  Time:    16:20              Narrative:    Routine CT of the abdomen and pelvis was performed with 100 cc Omnipaque 350 intravenous contrast. Sagittal and coronal reconstructions were done.     Comparison: CT abdomen and pelvis 9/14/15, ultrasound abdomen limited 6/1/17.    Visualized lower chest shows no lung consolidation or pleural fluid. No free air is seen in the abdomen. Spleen is normal. The liver is normal size with homogeneous parenchyma. The liver shows diffuse decrease in density consistent with fatty liver. The gallbladder is present and unremarkable without abnormal distention, opaque stone, or acute inflammatory change. Intrahepatic bile ducts and portal vein are unremarkable. The common bile duct is mildly dilated to 9-10 mm on coronal image 44 ; I detect no stone, tumor, or inflammatory change to explain this. The duct dilatation is similar to the old CT exam in 2015. Pancreas is unremarkable without acute inflammation, duct dilatation, or mass on routine CT exam.    Adrenal glands are normal. The kidneys are normal size and enhance normally without evidence of stone or obstruction. Tiny hypodensity in left kidney is too small to characterize, possible cyst. Ureters are normal caliber and clear to the bladder. The bladder and prostate are unremarkable.    Abdominal aorta tapers normally with all the major branch vessels patent . No significant ascites or lymphadenopathy is seen in the abdomen or pelvis.    Intestinal tract shows no obstruction or acute inflammatory process. Stomach is collapsed. Small bowel is normal caliber. Normal appendix is visualized.    Skeletal structures are intact. No abdominal wall hernias are seen.                             US Abdomen Limited (Final result)  Result time 06/01/17 14:24:57    Final result  by Darron De Jesus MD (06/01/17 14:24:57)                 Impression:         1. Limited study due to intestinal gas.  The common bile duct is dilated measuring up to 8mm.  There is no definite stone identified, a distal obstructive process of the common bile duct cannot be excluded including choledocholithiasis.  Additionally, the patient had a positive sonographic Loya sign.  Consider CT abdomen or MRCP, to correlate with lab results/liver enzymes.      Electronically signed by: DARRON DE JESUS MD  Date:     06/01/17  Time:    14:24              Narrative:    Limited abdominal ultrasound    Comparison: 6/9/15    Results: The liver appears homogeneous and normal in size measuring approximately 14.8 cm.  The common duct measures 8 mm , which is increased in size.  No definite obstruction is seen.  The gallbladder appears normal, no gallbladder wall edema or pericholecystic fluid.  The sonographic Loya's sign is positive.  The pancreas is obscured by overlying intestinal gas.  The right kidney measures 10.4 cm. no solid kidney mass, stones or hydronephrosis.  No ascites.                            Labs Reviewed   CBC W/ AUTO DIFFERENTIAL - Abnormal; Notable for the following:        Result Value    MCH 31.4 (*)     All other components within normal limits   COMPREHENSIVE METABOLIC PANEL - Abnormal; Notable for the following:     Chloride 111 (*)     ALT 50 (*)     Anion Gap 7 (*)     All other components within normal limits   URINALYSIS - Abnormal; Notable for the following:     Protein, UA Trace (*)     All other components within normal limits   LIPASE - Abnormal; Notable for the following:     Lipase 91 (*)     All other components within normal limits            Medical Decision Making:   ED Management:  Urgent evaluation of 31-year-old male who presents with complaints of right upper quadrant abdominal pain with vomiting concerning for biliary pathology.  He is afebrile, nontoxic appearing,  hemodynamically stable. Physical exam reveals right upper quadrant tenderness to palpation with Loya sign.  No other abdominal findings. Diagnostics pending.     UPDATE: diagnostic labs reveal no evidence of urinary infection.  CBC unremarkable.  Chemistry reveals slightly elevated ALT and mildly increased lipase at 91.ultrasound reveals dilatation of common bile duct with no clear stone visualized.  Further evaluation with CT is obtained given patient's persistent pain, elevated lipase and findings on ultrasound.  CT with IV contrast reveals dilatation of common bile duct with no evidence of stone or acute obstruction.  When compared to previous CT and 2015 there is also, bile duct dilatation seen in this is suspected to be chronic without clear etiology.  Patient is tolerating by mouth easily and reports significant improvement in pain.  He states her medical home.  I will discharge him with symptom management and encourage him to follow up with gastroenterology in one to 2 days for symptom recheck.  He is educated him return precautions and verbalizes understanding.  Case discussed with attending who agrees with plan.                   ED Course     Clinical Impression:   The primary encounter diagnosis was Dilated cbd, acquired. Diagnoses of Nausea and vomiting, intractability of vomiting not specified, unspecified vomiting type and Elevated lipase were also pertinent to this visit.          Janny Cortes PA-C  06/01/17 1925

## 2017-07-08 ENCOUNTER — HOSPITAL ENCOUNTER (OUTPATIENT)
Dept: RADIOLOGY | Facility: OTHER | Age: 32
Discharge: HOME | End: 2017-07-08
Attending: FAMILY MEDICINE

## 2017-07-08 DIAGNOSIS — S33.5XXA SPRAIN OF LUMBAR REGION: ICD-10-CM

## 2017-07-08 PROCEDURE — 72148 MRI LUMBAR SPINE W/O DYE: CPT | Mod: TC

## 2017-07-08 PROCEDURE — 72148 MRI LUMBAR SPINE W/O DYE: CPT | Mod: 26,,, | Performed by: RADIOLOGY

## 2017-09-07 ENCOUNTER — HOSPITAL ENCOUNTER (EMERGENCY)
Facility: OTHER | Age: 32
Discharge: HOME | End: 2017-09-07
Attending: EMERGENCY MEDICINE
Payer: COMMERCIAL

## 2017-09-07 VITALS
TEMPERATURE: 98 F | OXYGEN SATURATION: 100 % | SYSTOLIC BLOOD PRESSURE: 123 MMHG | DIASTOLIC BLOOD PRESSURE: 81 MMHG | BODY MASS INDEX: 29.62 KG/M2 | RESPIRATION RATE: 18 BRPM | HEART RATE: 73 BPM | HEIGHT: 69 IN | WEIGHT: 200 LBS

## 2017-09-07 DIAGNOSIS — K52.9 GASTROENTERITIS: Primary | ICD-10-CM

## 2017-09-07 LAB
BILIRUB UR QL STRIP: NEGATIVE
CLARITY UR: CLEAR
COLOR UR: YELLOW
GLUCOSE UR QL STRIP: NEGATIVE
HGB UR QL STRIP: NEGATIVE
KETONES UR QL STRIP: NEGATIVE
LEUKOCYTE ESTERASE UR QL STRIP: NEGATIVE
NITRITE UR QL STRIP: NEGATIVE
PH UR STRIP: 8 [PH] (ref 5–8)
PROT UR QL STRIP: ABNORMAL
SP GR UR STRIP: 1.02 (ref 1–1.03)
URN SPEC COLLECT METH UR: ABNORMAL
UROBILINOGEN UR STRIP-ACNC: 1 EU/DL

## 2017-09-07 PROCEDURE — 25000003 PHARM REV CODE 250: Performed by: PHYSICIAN ASSISTANT

## 2017-09-07 PROCEDURE — 81003 URINALYSIS AUTO W/O SCOPE: CPT

## 2017-09-07 PROCEDURE — 99283 EMERGENCY DEPT VISIT LOW MDM: CPT

## 2017-09-07 RX ORDER — DICYCLOMINE HYDROCHLORIDE 10 MG/1
20 CAPSULE ORAL
Status: COMPLETED | OUTPATIENT
Start: 2017-09-07 | End: 2017-09-07

## 2017-09-07 RX ORDER — ONDANSETRON 4 MG/1
4 TABLET, ORALLY DISINTEGRATING ORAL
Status: COMPLETED | OUTPATIENT
Start: 2017-09-07 | End: 2017-09-07

## 2017-09-07 RX ORDER — ONDANSETRON 4 MG/1
4 TABLET, ORALLY DISINTEGRATING ORAL EVERY 8 HOURS PRN
Qty: 20 TABLET | Refills: 0 | Status: SHIPPED | OUTPATIENT
Start: 2017-09-07 | End: 2018-06-15

## 2017-09-07 RX ADMIN — ONDANSETRON 4 MG: 4 TABLET, ORALLY DISINTEGRATING ORAL at 12:09

## 2017-09-07 RX ADMIN — DICYCLOMINE HYDROCHLORIDE 20 MG: 10 CAPSULE ORAL at 12:09

## 2017-09-07 NOTE — ED TRIAGE NOTES
+diahhrea x3 days w/ generalized abdominal pain . +vomitting started this morning. Denies sore throat, chest pain, SOB, fever.

## 2017-09-08 NOTE — ED PROVIDER NOTES
"Encounter Date: 9/7/2017       History     Chief Complaint   Patient presents with    Diarrhea     + intermittent diarrhea x three days w/ x 3 epsiodes this morning. + Nasuea w/ vomtiing. + generalzied abdominal pains descried as "pressure" rated 8/10 on pain scale. Denies fever, chills, dizziness or dysuria     Patient is a 32-year-old male with history of acid reflux and hemorrhoids who presents to the emergency department with vomiting and diarrhea.  Patient states last night he ate a hot sausage sandwich.  Patient states shortly thereafter he began to have nausea and vomiting and diarrhea.  Patient states he is having diffuse abdominal cramping.  He denies fevers or chills.  He denies urinary symptoms.  He states his wife ate the same food, and she has been sick as well.      The history is provided by the patient.     Review of patient's allergies indicates:  No Known Allergies  Past Medical History:   Diagnosis Date    GERD (gastroesophageal reflux disease)     Hemorrhoids     Internal hemorrhoids      History reviewed. No pertinent surgical history.  Family History   Problem Relation Age of Onset    Heart disease Mother     Heart disease Father      Social History   Substance Use Topics    Smoking status: Never Smoker    Smokeless tobacco: Not on file    Alcohol use Yes     Review of Systems   Constitutional: Negative for activity change, appetite change, chills, fatigue and fever.   HENT: Negative for congestion, ear discharge, ear pain, postnasal drip, rhinorrhea, sore throat and trouble swallowing.    Respiratory: Negative for cough and shortness of breath.    Cardiovascular: Negative for chest pain.   Gastrointestinal: Positive for abdominal pain, diarrhea, nausea and vomiting. Negative for blood in stool and constipation.   Genitourinary: Negative for dysuria, flank pain and hematuria.   Musculoskeletal: Negative for back pain, neck pain and neck stiffness.   Skin: Negative for rash and wound. "   Neurological: Negative for dizziness, syncope, speech difficulty, weakness, light-headedness, numbness and headaches.       Physical Exam     Initial Vitals [09/07/17 1145]   BP Pulse Resp Temp SpO2   119/66 84 16 98.3 °F (36.8 °C) 100 %      MAP       83.67         Physical Exam    Nursing note and vitals reviewed.  Constitutional: He appears well-developed and well-nourished. He is not diaphoretic.  Non-toxic appearance. No distress.   HENT:   Head: Normocephalic.   Right Ear: Hearing and external ear normal.   Left Ear: Hearing and external ear normal.   Nose: Nose normal.   Mouth/Throat: Uvula is midline and oropharynx is clear and moist. No trismus in the jaw. No uvula swelling. No oropharyngeal exudate.   Eyes: Conjunctivae are normal. Pupils are equal, round, and reactive to light.   Neck: Normal range of motion.   Cardiovascular: Normal rate and regular rhythm.   Pulmonary/Chest: Breath sounds normal. No respiratory distress. He has no wheezes. He has no rhonchi. He has no rales. He exhibits no tenderness.   Abdominal: Soft. Bowel sounds are normal. He exhibits no distension and no mass. There is no tenderness. There is no rebound, no guarding, no tenderness at McBurney's point and negative Loya's sign.   Lymphadenopathy:     He has no cervical adenopathy.   Neurological: He is alert and oriented to person, place, and time.   Skin: Skin is warm and dry. Capillary refill takes less than 2 seconds.   Psychiatric: He has a normal mood and affect.         ED Course   Procedures  Labs Reviewed   URINALYSIS - Abnormal; Notable for the following:        Result Value    Protein, UA Trace (*)     All other components within normal limits             Medical Decision Making:   Initial Assessment:   Urgent evaluation of 32-year-old male who presents to the emergency department with nausea, vomiting, and diarrhea.  Patient is afebrile, nontoxic appearing, and hemodynamically stable.  Moist membranes.  Benign  abdominal exam.  I suspect patient has food poisoning with history and presentation.  I do not feel that imaging is warranted.  Patient states he does not want IV fluids.  He states he would rather by mouth medications.  He states he is feeling much better now and really just needs a note for work.  Patient is advised to follow-up with PCP.  Patient is advised to return to the emergency department with any worsening symptoms or concerns.  Other:   I have discussed this case with another health care provider.       <> Summary of the Discussion: Dr. Servin                   ED Course      Clinical Impression:   The encounter diagnosis was Gastroenteritis.                           Alannah Croft PA-C  09/07/17 2015

## 2017-09-20 ENCOUNTER — OFFICE VISIT (OUTPATIENT)
Dept: SURGERY | Facility: CLINIC | Age: 32
End: 2017-09-20
Payer: COMMERCIAL

## 2017-09-20 VITALS
DIASTOLIC BLOOD PRESSURE: 79 MMHG | WEIGHT: 217.94 LBS | SYSTOLIC BLOOD PRESSURE: 116 MMHG | HEART RATE: 74 BPM | HEIGHT: 69 IN | TEMPERATURE: 98 F | BODY MASS INDEX: 32.28 KG/M2

## 2017-09-20 DIAGNOSIS — K83.8 COMMON BILE DUCT DILATATION: Primary | ICD-10-CM

## 2017-09-20 PROCEDURE — 99999 PR PBB SHADOW E&M-EST. PATIENT-LVL III: CPT | Mod: PBBFAC,,, | Performed by: SURGERY

## 2017-09-20 PROCEDURE — 3008F BODY MASS INDEX DOCD: CPT | Mod: S$GLB,,, | Performed by: SURGERY

## 2017-09-20 PROCEDURE — 99204 OFFICE O/P NEW MOD 45 MIN: CPT | Mod: S$GLB,,, | Performed by: SURGERY

## 2017-09-20 NOTE — PROGRESS NOTES
History & Physical    SUBJECTIVE:     History of Present Illness:  Patient is a 32 y.o. male presents with concern for gallstones. Pt is under the impression that he has gallstones, states he was told by an ER doctor a few months ago that he had a large stone that he was unable to pass. His main complaint is urinary symptoms, feels like it's hard to pass urine. He also notes some heartburn after meals which is relieved by PPI, doesn't seem to matter much what he eats. He also has occasional RUQ pain though the pain seems more related to palpation than food intake.     No previous EGD.    Chief Complaint   Patient presents with    Abdominal Pain       Review of patient's allergies indicates:  No Known Allergies    Current Outpatient Prescriptions   Medication Sig Dispense Refill    famotidine (PEPCID) 20 MG tablet Take 1 tablet (20 mg total) by mouth 2 (two) times daily. 20 tablet 0    ibuprofen (ADVIL,MOTRIN) 600 MG tablet Take 1 tablet (600 mg total) by mouth every 6 (six) hours as needed for Pain. 20 tablet 0    ondansetron (ZOFRAN-ODT) 4 MG TbDL Take 1 tablet (4 mg total) by mouth every 8 (eight) hours as needed (nausea). 20 tablet 0    ranitidine (ZANTAC) 150 MG capsule Take 150 mg by mouth 2 (two) times daily.       No current facility-administered medications for this visit.        Past Medical History:   Diagnosis Date    GERD (gastroesophageal reflux disease)     Hemorrhoids     Internal hemorrhoids      History reviewed. No pertinent surgical history.  Family History   Problem Relation Age of Onset    Heart disease Mother     Heart disease Father      Social History   Substance Use Topics    Smoking status: Current Every Day Smoker     Types: Cigars    Smokeless tobacco: Not on file    Alcohol use Yes        Review of Systems:  Review of Systems   Constitutional: Negative for activity change and unexpected weight change.   HENT: Negative for congestion and facial swelling.    Respiratory:  "Negative for cough and shortness of breath.    Cardiovascular: Negative for chest pain and palpitations.   Gastrointestinal: Positive for diarrhea and nausea. Negative for abdominal pain and vomiting.   Musculoskeletal: Negative for arthralgias and gait problem.   Skin: Negative for color change and wound.   Allergic/Immunologic: Negative for environmental allergies and food allergies.   Neurological: Negative for weakness and numbness.   Psychiatric/Behavioral: Negative for agitation and confusion.       OBJECTIVE:     Vital Signs (Most Recent)  Temp: 97.9 °F (36.6 °C) (09/20/17 1314)  Pulse: 74 (09/20/17 1314)  BP: 116/79 (09/20/17 1314)  5' 9" (1.753 m)  98.9 kg (217 lb 14.8 oz)     Physical Exam:  Physical Exam   Constitutional: He is oriented to person, place, and time. No distress.   HENT:   Head: Normocephalic and atraumatic.   Cardiovascular: Normal rate and regular rhythm.    Pulmonary/Chest: Effort normal and breath sounds normal.   Abdominal: Soft. He exhibits no distension.   Mildly ttp at right costal margin   Musculoskeletal: Normal range of motion. He exhibits no edema.   Neurological: He is alert and oriented to person, place, and time.   Skin: Skin is warm and dry. He is not diaphoretic.   Psychiatric: He has a normal mood and affect. His behavior is normal.       Laboratory  CBC: Reviewed  CMP: Reviewed  Lipase 91 6/17    Diagnostic Results:  US Abd 6/17: No gallstones    Ct abd/pelivs 6/17: No gallstones, dilated CBD 9-10 mm    ASSESSMENT/PLAN:   31 yo M with common bile duct dilation, no evidence of stones, and history of mildly elevated lipase    -recommended MRCP, but pt refused MRI 2/2 fear  -will refer to GI to eval for EGD/EUS, pt with symptoms he attributes to reflux and dilation of duct with no stones on imaging, could eval for choledocholithiasis with the EUS  -if gallstones are found, can perform lap jones  -if no gallstones and no other pathology on EGD, can consider HIDA  -rtc after " RANI Diggs MD  PGY-4 General Surgery  Pager: [phone]

## 2017-09-21 PROBLEM — K83.8 COMMON BILE DUCT DILATATION: Status: ACTIVE | Noted: 2017-09-21

## 2017-09-28 ENCOUNTER — ANESTHESIA EVENT (OUTPATIENT)
Dept: ENDOSCOPY | Facility: HOSPITAL | Age: 32
End: 2017-09-28
Payer: COMMERCIAL

## 2017-09-29 ENCOUNTER — SURGERY (OUTPATIENT)
Age: 32
End: 2017-09-29

## 2017-09-29 ENCOUNTER — ANESTHESIA (OUTPATIENT)
Dept: ENDOSCOPY | Facility: HOSPITAL | Age: 32
End: 2017-09-29
Payer: COMMERCIAL

## 2017-09-29 ENCOUNTER — HOSPITAL ENCOUNTER (OUTPATIENT)
Facility: HOSPITAL | Age: 32
Discharge: HOME | End: 2017-09-29
Attending: INTERNAL MEDICINE | Admitting: INTERNAL MEDICINE
Payer: COMMERCIAL

## 2017-09-29 VITALS
SYSTOLIC BLOOD PRESSURE: 133 MMHG | BODY MASS INDEX: 31.99 KG/M2 | HEART RATE: 82 BPM | RESPIRATION RATE: 16 BRPM | TEMPERATURE: 98 F | HEIGHT: 69 IN | OXYGEN SATURATION: 98 % | DIASTOLIC BLOOD PRESSURE: 78 MMHG | WEIGHT: 216 LBS

## 2017-09-29 DIAGNOSIS — R11.2 INTRACTABLE VOMITING WITH NAUSEA, UNSPECIFIED VOMITING TYPE: Primary | ICD-10-CM

## 2017-09-29 DIAGNOSIS — R10.9 ABDOMINAL PAIN: ICD-10-CM

## 2017-09-29 DIAGNOSIS — K83.8 COMMON BILE DUCT DILATATION: ICD-10-CM

## 2017-09-29 PROCEDURE — 63600175 PHARM REV CODE 636 W HCPCS: Performed by: NURSE ANESTHETIST, CERTIFIED REGISTERED

## 2017-09-29 PROCEDURE — 25000003 PHARM REV CODE 250: Performed by: INTERNAL MEDICINE

## 2017-09-29 PROCEDURE — 43259 EGD US EXAM DUODENUM/JEJUNUM: CPT | Performed by: INTERNAL MEDICINE

## 2017-09-29 PROCEDURE — 25000003 PHARM REV CODE 250: Performed by: NURSE ANESTHETIST, CERTIFIED REGISTERED

## 2017-09-29 PROCEDURE — 37000008 HC ANESTHESIA 1ST 15 MINUTES: Performed by: INTERNAL MEDICINE

## 2017-09-29 PROCEDURE — 37000009 HC ANESTHESIA EA ADD 15 MINS: Performed by: INTERNAL MEDICINE

## 2017-09-29 PROCEDURE — 43237 ENDOSCOPIC US EXAM ESOPH: CPT | Mod: ,,, | Performed by: INTERNAL MEDICINE

## 2017-09-29 RX ORDER — GLYCOPYRROLATE 0.2 MG/ML
INJECTION INTRAMUSCULAR; INTRAVENOUS
Status: DISCONTINUED | OUTPATIENT
Start: 2017-09-29 | End: 2017-09-29

## 2017-09-29 RX ORDER — SODIUM CHLORIDE 9 MG/ML
INJECTION, SOLUTION INTRAVENOUS CONTINUOUS
Status: DISCONTINUED | OUTPATIENT
Start: 2017-09-29 | End: 2017-09-29 | Stop reason: HOSPADM

## 2017-09-29 RX ORDER — PROPOFOL 10 MG/ML
VIAL (ML) INTRAVENOUS
Status: DISCONTINUED | OUTPATIENT
Start: 2017-09-29 | End: 2017-09-29

## 2017-09-29 RX ORDER — FENTANYL CITRATE 50 UG/ML
INJECTION, SOLUTION INTRAMUSCULAR; INTRAVENOUS
Status: DISCONTINUED | OUTPATIENT
Start: 2017-09-29 | End: 2017-09-29

## 2017-09-29 RX ORDER — LIDOCAINE HCL/PF 100 MG/5ML
SYRINGE (ML) INTRAVENOUS
Status: DISCONTINUED | OUTPATIENT
Start: 2017-09-29 | End: 2017-09-29

## 2017-09-29 RX ORDER — PROPOFOL 10 MG/ML
VIAL (ML) INTRAVENOUS CONTINUOUS PRN
Status: DISCONTINUED | OUTPATIENT
Start: 2017-09-29 | End: 2017-09-29

## 2017-09-29 RX ADMIN — PROPOFOL 175 MCG/KG/MIN: 10 INJECTION, EMULSION INTRAVENOUS at 09:09

## 2017-09-29 RX ADMIN — FENTANYL CITRATE 100 MCG: 50 INJECTION, SOLUTION INTRAMUSCULAR; INTRAVENOUS at 09:09

## 2017-09-29 RX ADMIN — PROPOFOL 100 MG: 10 INJECTION, EMULSION INTRAVENOUS at 09:09

## 2017-09-29 RX ADMIN — GLYCOPYRROLATE 0.2 MG: 0.2 INJECTION, SOLUTION INTRAMUSCULAR; INTRAVENOUS at 08:09

## 2017-09-29 RX ADMIN — PROPOFOL 50 MG: 10 INJECTION, EMULSION INTRAVENOUS at 09:09

## 2017-09-29 RX ADMIN — SODIUM CHLORIDE: 0.9 INJECTION, SOLUTION INTRAVENOUS at 08:09

## 2017-09-29 RX ADMIN — LIDOCAINE HYDROCHLORIDE 75 MG: 20 INJECTION, SOLUTION INTRAVENOUS at 09:09

## 2017-09-29 NOTE — ANESTHESIA PREPROCEDURE EVALUATION
09/29/2017  Ignacio ALEC Su is a 32 y.o., male for upper EUS and ERCP under MAC/GA    Anesthesia Evaluation     I have reviewed the Nursing Notes.   I have reviewed the Medications.     Review of Systems  Anesthesia Hx:  No previous Anesthesia   Social:  Smoker, Alcohol Use    Cardiovascular:  Cardiovascular Normal Exercise tolerance: good     Hepatic/GI:   GERD        Physical Exam  General:  Obesity       Chest/Lungs:  Chest/Lungs Clear    Heart/Vascular:  Heart Findings: Normal            Anesthesia Plan  Type of Anesthesia, risks & benefits discussed:  Anesthesia Type:  MAC, general  Patient's Preference:   Intra-op Monitoring Plan:   Intra-op Monitoring Plan Comments:   Post Op Pain Control Plan:   Post Op Pain Control Plan Comments:   Induction:    Beta Blocker:  Patient is not currently on a Beta-Blocker (No further documentation required).       Informed Consent: Patient understands risks and agrees with Anesthesia plan.  Questions answered. Anesthesia consent signed with patient.  ASA Score: 2     Day of Surgery Review of History & Physical:            Ready For Surgery From Anesthesia Perspective.

## 2017-09-29 NOTE — DISCHARGE INSTRUCTIONS
Post Upper EUS Instructions:     Ignacio Su  9/29/2017  Sherif Cohen MD    1. Do Not eat or drink anything for 1 hour. Try sips of water first. If tolerated, resume your regular diet or one recommended by your physician.  2. Do not drive, or operate machinery, make critical decisions, or do activities that require coordination or balance for 24 hours.  3. You may experience a sore throat for 24 to 48 hours. You may use throat lozenges or gargle with warm salt water to relieve the discomfort.  4. Because air was put into your stomach during the procedure, you may experience some belching.   Go directly to the emergency room if you notice any of the following:                              Chills and/or fever over 101                Persistent vomiting or vomiting with blood                Severe abdominal pain, other than gas cramps                Severe chest pain                Black, tarry stools    If you have any questions or problems, please call your Physician:    Sherif Cohen MD     Lab Results: [phone]    If a complication or emergency situation arises and you are unable to reach your Physician - GO TO THE EMERGENCY ROOM.

## 2017-09-29 NOTE — ANESTHESIA POSTPROCEDURE EVALUATION
"Anesthesia Post Evaluation    Patient: Ignacio Su    Procedure(s) Performed: Procedure(s) (LRB):  ULTRASOUND-ENDOSCOPIC-UPPER (N/A)  ERCP (N/A)    Final Anesthesia Type: MAC  Patient location during evaluation: GI PACU  Patient participation: Yes- Able to Participate  Level of consciousness: awake and alert  Post-procedure vital signs: reviewed and stable  Pain management: adequate  Airway patency: patent  PONV status at discharge: No PONV  Anesthetic complications: no      Cardiovascular status: blood pressure returned to baseline and hemodynamically stable  Respiratory status: unassisted, spontaneous ventilation and room air  Hydration status: euvolemic  Follow-up not needed.        Visit Vitals  /63 (BP Location: Right arm, Patient Position: Lying)   Pulse 79   Temp 36.5 °C (97.7 °F) (Oral)   Resp 20   Ht 5' 9" (1.753 m)   Wt 98 kg (216 lb)   SpO2 97%   BMI 31.90 kg/m²       Pain/Elissa Score: Pain Assessment Performed: Yes (9/29/2017  8:13 AM)  Presence of Pain: denies (9/29/2017  8:13 AM)      "

## 2017-09-29 NOTE — TRANSFER OF CARE
"Anesthesia Transfer of Care Note    Patient: Ignacio Su    Procedure(s) Performed: Procedure(s) (LRB):  ULTRASOUND-ENDOSCOPIC-UPPER (N/A)  ERCP (N/A)    Patient location: GI    Anesthesia Type: MAC    Transport from OR: Transported from OR on room air with adequate spontaneous ventilation    Post pain: adequate analgesia    Post assessment: no apparent anesthetic complications    Post vital signs: stable    Level of consciousness: awake    Nausea/Vomiting: no nausea/vomiting    Complications: none    Transfer of care protocol was followed      Last vitals:   Visit Vitals  /63 (BP Location: Right arm, Patient Position: Lying)   Pulse 79   Temp 36.5 °C (97.7 °F) (Oral)   Resp 20   Ht 5' 9" (1.753 m)   Wt 98 kg (216 lb)   SpO2 97%   BMI 31.90 kg/m²     "

## 2017-09-29 NOTE — H&P
Short Stay Endoscopy History and Physical    PCP - Primary Doctor No  Referring Physician - Tiffani Porter MD  [address]  [address]  [place] [id number]    Procedure - eus/ercp  ASA - per anesthesia  Mallampati - per anesthesia  History of Anesthesia problems - no  Family history Anesthesia problems -  no   Plan of anesthesia - General    HPI:  This is a 32 y.o. male here for evaluation of: dilated bile duct    Reflux - no  Dysphagia - no  Abdominal pain - no  Diarrhea - no    ROS:  Constitutional: No fevers, chills, No weight loss  CV: No chest pain  Pulm: No cough, No shortness of breath  Ophtho: No vision changes  GI: see HPI  Derm: No rash    Medical History:  has a past medical history of GERD (gastroesophageal reflux disease); Hemorrhoids; and Internal hemorrhoids.    Surgical History:  has no past surgical history on file.    Family History: family history includes Heart disease in his father and mother..    Social History:  reports that he has been smoking Cigars.  He uses smokeless tobacco. He reports that he drinks alcohol. He reports that he does not use drugs.    Review of patient's allergies indicates:  No Known Allergies    Medications:   Prescriptions Prior to Admission   Medication Sig Dispense Refill Last Dose    famotidine (PEPCID) 20 MG tablet Take 1 tablet (20 mg total) by mouth 2 (two) times daily. 20 tablet 0 Past Month at Unknown time    ibuprofen (ADVIL,MOTRIN) 600 MG tablet Take 1 tablet (600 mg total) by mouth every 6 (six) hours as needed for Pain. 20 tablet 0 Past Month at Unknown time    ondansetron (ZOFRAN-ODT) 4 MG TbDL Take 1 tablet (4 mg total) by mouth every 8 (eight) hours as needed (nausea). 20 tablet 0 Past Week at Unknown time    ranitidine (ZANTAC) 150 MG capsule Take 150 mg by mouth 2 (two) times daily.   Past Week at Unknown time       Physical Exam:    Vital Signs:   Vitals:    09/29/17 0809   BP: 116/63   Pulse: 79   Resp: 20   Temp: 97.7 °F (36.5 °C)        General Appearance: Well appearing in no acute distress  Eyes:    No scleral icterus  ENT: Neck supple  Lungs: CTA anteriorly  Heart:  Regular rate  Abdomen: Soft, non tender, non distended with normal bowel sounds.  Extremities: No edema  Skin: No rash    Labs:  Lab Results   Component Value Date    WBC 6.50 06/01/2017    HGB 14.8 06/01/2017    HCT 45.3 06/01/2017     06/01/2017    ALT 50 (H) 06/01/2017    AST 36 06/01/2017     06/01/2017    K 4.2 06/01/2017     (H) 06/01/2017    CREATININE 0.8 06/01/2017    BUN 6 06/01/2017    CO2 25 06/01/2017    INR 1.0 10/13/2015       I have explained the risks and benefits of this endoscopic procedure to the patient including but not limited to bleeding, inflammation, infection, perforation, and death.      Sherif Cohen MD

## 2017-10-02 ENCOUNTER — TELEPHONE (OUTPATIENT)
Dept: ENDOSCOPY | Facility: HOSPITAL | Age: 32
End: 2017-10-02

## 2017-10-13 ENCOUNTER — TELEPHONE (OUTPATIENT)
Dept: ENDOSCOPY | Facility: HOSPITAL | Age: 32
End: 2017-10-13

## 2018-04-22 ENCOUNTER — HOSPITAL ENCOUNTER (EMERGENCY)
Facility: OTHER | Age: 33
Discharge: HOME | End: 2018-04-22
Attending: EMERGENCY MEDICINE
Payer: COMMERCIAL

## 2018-04-22 VITALS
WEIGHT: 194.88 LBS | HEIGHT: 69 IN | SYSTOLIC BLOOD PRESSURE: 124 MMHG | RESPIRATION RATE: 14 BRPM | DIASTOLIC BLOOD PRESSURE: 68 MMHG | BODY MASS INDEX: 28.87 KG/M2 | OXYGEN SATURATION: 97 % | TEMPERATURE: 99 F | HEART RATE: 76 BPM

## 2018-04-22 DIAGNOSIS — S09.90XA HEAD INJURIES, INITIAL ENCOUNTER: Primary | ICD-10-CM

## 2018-04-22 PROCEDURE — 99284 EMERGENCY DEPT VISIT MOD MDM: CPT

## 2018-04-22 PROCEDURE — 25000003 PHARM REV CODE 250: Performed by: EMERGENCY MEDICINE

## 2018-04-22 RX ORDER — IBUPROFEN 600 MG/1
600 TABLET ORAL
Status: COMPLETED | OUTPATIENT
Start: 2018-04-22 | End: 2018-04-22

## 2018-04-22 RX ORDER — IBUPROFEN 600 MG/1
600 TABLET ORAL EVERY 6 HOURS PRN
Qty: 20 TABLET | Refills: 0 | Status: SHIPPED | OUTPATIENT
Start: 2018-04-22 | End: 2018-06-15

## 2018-04-22 RX ADMIN — IBUPROFEN 600 MG: 600 TABLET, FILM COATED ORAL at 03:04
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
## 2018-04-22 NOTE — ED NOTES
LOC: The patient is awake, alert and aware of environment with an appropriate affect, the patient is oriented x 3 and speaking appropriately.  APPEARANCE: Patient resting comfortably and in no acute distress, patient is clean and well groomed, patient's clothing is properly fastened.  SKIN: The skin is warm and dry, patient has normal skin turgor and moist mucus membranes, skin intact, no breakdown or brusing noted.  MUSKULOSKELETAL: Patient moving all extremities well, no obvious swelling or deformities noted.  RESPIRATORY: Airway is open and patent, respirations are spontaneous, patient has a normal effort and rate. Breath sounds are clear and equal bilaterally.  CARDIAC: Normal heart sounds. No peripheral edema.

## 2018-06-15 ENCOUNTER — HOSPITAL ENCOUNTER (EMERGENCY)
Facility: OTHER | Age: 33
Discharge: HOME | End: 2018-06-15
Attending: EMERGENCY MEDICINE
Payer: COMMERCIAL

## 2018-06-15 VITALS
BODY MASS INDEX: 25.2 KG/M2 | DIASTOLIC BLOOD PRESSURE: 85 MMHG | RESPIRATION RATE: 16 BRPM | WEIGHT: 180 LBS | OXYGEN SATURATION: 99 % | HEART RATE: 88 BPM | HEIGHT: 71 IN | SYSTOLIC BLOOD PRESSURE: 127 MMHG | TEMPERATURE: 99 F

## 2018-06-15 DIAGNOSIS — M79.673 FOOT PAIN: Primary | ICD-10-CM

## 2018-06-15 PROCEDURE — 25000003 PHARM REV CODE 250: Performed by: EMERGENCY MEDICINE

## 2018-06-15 PROCEDURE — 99283 EMERGENCY DEPT VISIT LOW MDM: CPT | Mod: 25

## 2018-06-15 RX ORDER — HYDROCODONE BITARTRATE AND ACETAMINOPHEN 10; 325 MG/1; MG/1
1 TABLET ORAL
Status: COMPLETED | OUTPATIENT
Start: 2018-06-15 | End: 2018-06-15

## 2018-06-15 RX ORDER — IBUPROFEN 800 MG/1
800 TABLET ORAL EVERY 6 HOURS PRN
Qty: 20 TABLET | Refills: 0 | Status: SHIPPED | OUTPATIENT
Start: 2018-06-15 | End: 2020-11-15 | Stop reason: ALTCHOICE

## 2018-06-15 RX ADMIN — HYDROCODONE BITARTRATE AND ACETAMINOPHEN 1 TABLET: 10; 325 TABLET ORAL at 11:06

## 2018-06-15 NOTE — ED PROVIDER NOTES
Encounter Date: 6/15/2018    SCRIBE #1 NOTE: Imelda BOSE am scribing for, and in the presence of, Dr. Mtz.       History     Chief Complaint   Patient presents with    Fall     from ladder > 12 ft, c/o pain to R foot     Time seen by provider: 11:47 AM    This is a 32 y.o. male who presents with complaint of R heel pain s/p fall PTA. Pt was painting a house when the ladder fell. He states that he fell straight down onto the R foot. No pain to the knee or ankle. Pt denies any back pain, neck pain, joint swelling, and wound. No numbness, tingling, or weakness. No head trauma or LOC.  Denies any blood thinner use.      The history is provided by the patient.     Review of patient's allergies indicates:  No Known Allergies  Past Medical History:   Diagnosis Date    GERD (gastroesophageal reflux disease)     Hemorrhoids     Internal hemorrhoids      History reviewed. No pertinent surgical history.  Family History   Problem Relation Age of Onset    Heart disease Mother     Heart disease Father      Social History   Substance Use Topics    Smoking status: Former Smoker     Types: Cigars    Smokeless tobacco: Current User      Comment: doesnt want to quit    Alcohol use Yes      Comment: every day anter[address]     Review of Systems   Constitutional: Negative for chills and fever.   HENT: Negative for facial swelling.    Respiratory: Negative for cough and shortness of breath.    Cardiovascular: Negative for chest pain.   Gastrointestinal: Negative for nausea and vomiting.   Musculoskeletal: Negative for back pain, joint swelling and neck pain.        R heel pain. No R knee or ankle pain.   Skin: Negative for rash and wound.   Allergic/Immunologic: Negative for immunocompromised state.   Neurological: Negative for dizziness, weakness and numbness.        Negative for tingling.       Physical Exam     Initial Vitals [06/15/18 1139]   BP Pulse Resp Temp SpO2   (!) 103/58 90 16 98.5 °F (36.9 °C) 97 %      MAP        --         Physical Exam    Nursing note and vitals reviewed.  Constitutional: He appears well-developed and well-nourished. He is not diaphoretic. No distress.   HENT:   Head: Normocephalic and atraumatic.   Right Ear: External ear normal.   Left Ear: External ear normal.   Eyes: Right eye exhibits no discharge. Left eye exhibits no discharge.   Neck: Normal range of motion. Neck supple.   Cardiovascular:   2+ DP PT pulses bilaterally.   Pulmonary/Chest: No respiratory distress.   Musculoskeletal: Normal range of motion.   Tenderness and mild swelling to the R calcaneous. No midline C/T/L-spine tenderness.   Neurological: He is alert and oriented to person, place, and time. No sensory deficit.   Skin: Skin is warm and dry. No rash noted. No erythema.   Psychiatric: He has a normal mood and affect. His behavior is normal.         ED Course   Procedures  Labs Reviewed - No data to display       X-Rays:   Independently Interpreted Readings:   Other Readings:  X-Ray Foot Complete Right: No acute fractures.     Imaging Results          X-Ray Foot Complete Right (Final result)  Result time 06/15/18 12:11:37    Final result by Alejandra Dunham MD (06/15/18 12:11:37)                 Impression:      As above      Electronically signed by: Alejandra Dunham MD  Date:    06/15/2018  Time:    12:11             Narrative:    EXAMINATION:  XR FOOT COMPLETE 3 VIEW RIGHT    CLINICAL HISTORY:  . Pain in unspecified foot    TECHNIQUE:  AP, lateral, and oblique views of the right foot were performed.    COMPARISON:  No    FINDINGS:  There is a small posterior calcaneal spur.  No significant degree of degenerative change is present.  There is no evidence of fracture.                                Medical Decision Making:   History:   Old Medical Records: I decided to obtain old medical records.  Old Records Summarized: other records.  Initial Assessment:   11:47AM:  Pt is a 33 y/o M who presents to ED s/p fall with resultant R  heel pain.  Pt appears well, nontoxic. No midline C/T/L spine tenderness. Pt is neurologically intact.  Will plan for imaging, analgesia, will continue to follow and reassess.  Independently Interpreted Test(s):   I have ordered and independently interpreted X-rays - see prior notes.  Clinical Tests:   Radiological Study: Ordered and Reviewed    12:54 PM:  Patient doing well.  He is feeling better after taking a hydrocodone.  His x-ray is negative for any acute findings.  Will plan to wrapp his foot with an Ace wrap and give crutches.  I updated the patient regarding the results.  I counseled the patient regarding supportive care measures.  I have discussed with the pt ED return warnings and need for close PCP f/u.  Pt agreeable to plan and all questions answered.  I feel that pt is stable for discharge and management as an outpatient and no further intervention is needed at this time.  Pt is comfortable returning to the ED if needed.  Will DC home in stable condition.                  Scribe Attestation:   Scribe #1: I performed the above scribed service and the documentation accurately describes the services I performed. I attest to the accuracy of the note.    Attending Attestation:           Physician Attestation for Scribe:  Physician Attestation Statement for Scribe #1: I, Dr. Mtz, reviewed documentation, as scribed by Imelda Jin in my presence, and it is both accurate and complete.                    Clinical Impression:     1. Foot pain                                 Shania Mtz MD  06/15/18 3655

## 2018-06-15 NOTE — ED NOTES
Pt given d/c instructions and rx, states understanding of both, ambulatory to lobby with crutch assist, aaox4, rrr unlabored, nadn, awaiting ride from [employer] in lobby

## 2018-06-15 NOTE — DISCHARGE INSTRUCTIONS
We have provided you with a prescription for medication for pain. Please fill and take as directed.    Please return to the ER if you have chest pain, difficulty breathing, fevers, altered mental status, dizziness, weakness, or any other concerns.      Follow up with your primary care physician.

## 2018-06-15 NOTE — ED NOTES
Ace wrap placed on right ankle, pt educated on proper fitment and circulation, supplied with crutches, instructed on proper fitment of crutches

## 2020-01-21 ENCOUNTER — HOSPITAL ENCOUNTER (EMERGENCY)
Facility: OTHER | Age: 35
Discharge: HOME | End: 2020-01-22
Attending: EMERGENCY MEDICINE

## 2020-01-21 DIAGNOSIS — J10.1 INFLUENZA B: Primary | ICD-10-CM

## 2020-01-21 LAB
ANION GAP SERPL CALC-SCNC: 9 MMOL/L (ref 8–16)
BASOPHILS # BLD AUTO: 0.02 K/UL (ref 0–0.2)
BASOPHILS NFR BLD: 0.4 % (ref 0–1.9)
BUN SERPL-MCNC: 7 MG/DL (ref 6–20)
CALCIUM SERPL-MCNC: 8.7 MG/DL (ref 8.7–10.5)
CHLORIDE SERPL-SCNC: 107 MMOL/L (ref 95–110)
CK SERPL-CCNC: 100 U/L (ref 20–200)
CO2 SERPL-SCNC: 26 MMOL/L (ref 23–29)
CREAT SERPL-MCNC: 1 MG/DL (ref 0.5–1.4)
DIFFERENTIAL METHOD: ABNORMAL
EOSINOPHIL # BLD AUTO: 0 K/UL (ref 0–0.5)
EOSINOPHIL NFR BLD: 0.4 % (ref 0–8)
ERYTHROCYTE [DISTWIDTH] IN BLOOD BY AUTOMATED COUNT: 13 % (ref 11.5–14.5)
EST. GFR  (AFRICAN AMERICAN): >60 ML/MIN/1.73 M^2
EST. GFR  (NON AFRICAN AMERICAN): >60 ML/MIN/1.73 M^2
EXTRA BLUE TOP RAINBOW: NORMAL
EXTRA GOLD TOP RAINBOW: NORMAL
EXTRA RED TOP RAINBOW: NORMAL
GLUCOSE SERPL-MCNC: 96 MG/DL (ref 70–110)
HCT VFR BLD AUTO: 44.2 % (ref 40–54)
HGB BLD-MCNC: 14.1 G/DL (ref 14–18)
IMM GRANULOCYTES # BLD AUTO: 0.03 K/UL (ref 0–0.04)
IMM GRANULOCYTES NFR BLD AUTO: 0.5 % (ref 0–0.5)
INFLUENZA A, MOLECULAR: NEGATIVE
INFLUENZA B, MOLECULAR: POSITIVE
LYMPHOCYTES # BLD AUTO: 1.9 K/UL (ref 1–4.8)
LYMPHOCYTES NFR BLD: 34 % (ref 18–48)
MCH RBC QN AUTO: 30.3 PG (ref 27–31)
MCHC RBC AUTO-ENTMCNC: 31.9 G/DL (ref 32–36)
MCV RBC AUTO: 95 FL (ref 82–98)
MONOCYTES # BLD AUTO: 0.8 K/UL (ref 0.3–1)
MONOCYTES NFR BLD: 14.1 % (ref 4–15)
NEUTROPHILS # BLD AUTO: 2.8 K/UL (ref 1.8–7.7)
NEUTROPHILS NFR BLD: 50.6 % (ref 38–73)
NRBC BLD-RTO: 0 /100 WBC
PLATELET # BLD AUTO: 135 K/UL (ref 150–350)
PMV BLD AUTO: 10.8 FL (ref 9.2–12.9)
POTASSIUM SERPL-SCNC: 4.2 MMOL/L (ref 3.5–5.1)
RBC # BLD AUTO: 4.66 M/UL (ref 4.6–6.2)
SODIUM SERPL-SCNC: 142 MMOL/L (ref 136–145)
SPECIMEN SOURCE: ABNORMAL
WBC # BLD AUTO: 5.53 K/UL (ref 3.9–12.7)

## 2020-01-21 PROCEDURE — 25500020 PHARM REV CODE 255: Performed by: EMERGENCY MEDICINE

## 2020-01-21 PROCEDURE — 87502 INFLUENZA DNA AMP PROBE: CPT

## 2020-01-21 PROCEDURE — 82550 ASSAY OF CK (CPK): CPT

## 2020-01-21 PROCEDURE — 63600175 PHARM REV CODE 636 W HCPCS: Performed by: EMERGENCY MEDICINE

## 2020-01-21 PROCEDURE — 25000003 PHARM REV CODE 250: Performed by: EMERGENCY MEDICINE

## 2020-01-21 PROCEDURE — 80048 BASIC METABOLIC PNL TOTAL CA: CPT

## 2020-01-21 PROCEDURE — 96360 HYDRATION IV INFUSION INIT: CPT

## 2020-01-21 PROCEDURE — 99285 EMERGENCY DEPT VISIT HI MDM: CPT | Mod: 25

## 2020-01-21 PROCEDURE — 85025 COMPLETE CBC W/AUTO DIFF WBC: CPT

## 2020-01-21 RX ORDER — BUTALBITAL, ACETAMINOPHEN AND CAFFEINE 50; 325; 40 MG/1; MG/1; MG/1
1 TABLET ORAL
Status: COMPLETED | OUTPATIENT
Start: 2020-01-21 | End: 2020-01-21

## 2020-01-21 RX ORDER — SODIUM CHLORIDE 9 MG/ML
1000 INJECTION, SOLUTION INTRAVENOUS
Status: COMPLETED | OUTPATIENT
Start: 2020-01-21 | End: 2020-01-21

## 2020-01-21 RX ADMIN — IOHEXOL 100 ML: 350 INJECTION, SOLUTION INTRAVENOUS at 10:01

## 2020-01-21 RX ADMIN — SODIUM CHLORIDE 1000 ML: 0.9 INJECTION, SOLUTION INTRAVENOUS at 09:01

## 2020-01-21 RX ADMIN — BUTALBITAL, ACETAMINOPHEN AND CAFFEINE 1 TABLET: 50; 325; 40 TABLET ORAL at 09:01

## 2020-01-22 VITALS
TEMPERATURE: 99 F | HEIGHT: 70 IN | DIASTOLIC BLOOD PRESSURE: 58 MMHG | HEART RATE: 77 BPM | RESPIRATION RATE: 16 BRPM | OXYGEN SATURATION: 96 % | BODY MASS INDEX: 29.35 KG/M2 | SYSTOLIC BLOOD PRESSURE: 119 MMHG | WEIGHT: 205 LBS

## 2020-01-22 NOTE — ED NOTES
Pt rounding complete.  Pain 4/10.  Restroom and comfort needs addressed.  Pt updated on plan of care.  Call light within reach.  Will continue to monitor.  Visitor at bedside.

## 2020-01-22 NOTE — ED PROVIDER NOTES
"Encounter Date: 1/21/2020    SCRIBE #1 NOTE: I, Mercedes Cowan, am scribing for, and in the presence of, Dr. Gonzáles.       History     Chief Complaint   Patient presents with    Fever     pt reports subjective fever, generalized fatigue, chills, sweats, and HA x3days     Time seen by provider: 9:12 PM    This is a 34 y.o. male who presents with multiple complaints. He reports "tight, cramping" right sided body pain that began one hour ago and right arm tingling, but denies extremity weakness. He also reports fatigue, subjective fever, non productive cough, sore throat for three days, nausea, and diarrhea. He denies ear pain, vomiting, or abdominal pain.    Patient also has complaint of headache over the bilateral temple. He denies sensitivity to light or sound. He experiences headaches every few months. He has taken ibuprofen 800 mg with relief of headache. He has not taken tylenol. He reports FHx of aneurysm.    The history is provided by the patient and a relative.     Review of patient's allergies indicates:  No Known Allergies  Past Medical History:   Diagnosis Date    GERD (gastroesophageal reflux disease)     Hemorrhoids     Internal hemorrhoids      History reviewed. No pertinent surgical history.  Family History   Problem Relation Age of Onset    Heart disease Mother     Heart disease Father      Social History     Tobacco Use    Smoking status: Former Smoker     Types: Cigars    Smokeless tobacco: Current User    Tobacco comment: doesnt want to quit   Substance Use Topics    Alcohol use: Yes     Comment: every day anterdam [address]    Drug use: No     Review of Systems   Constitutional: Positive for fatigue and fever.   HENT: Positive for sore throat. Negative for ear pain.         Negative for sensitivity to sound.   Eyes: Negative for photophobia and visual disturbance.   Respiratory: Positive for cough. Negative for shortness of breath.    Cardiovascular: Negative for chest pain.   Gastrointestinal: " Positive for diarrhea and nausea. Negative for abdominal pain, blood in stool and vomiting.   Genitourinary: Negative for dysuria.   Musculoskeletal: Positive for myalgias (right sided). Negative for back pain.   Skin: Negative for rash.   Neurological: Positive for headaches. Negative for weakness and numbness.        Positive for tingling.       Physical Exam     Initial Vitals [01/21/20 2104]   BP Pulse Resp Temp SpO2   119/77 86 18 99.5 °F (37.5 °C) 98 %      MAP       --         Physical Exam    Nursing note and vitals reviewed.  Constitutional: He appears well-developed and well-nourished. He is not diaphoretic. No distress.   HENT:   Head: Normocephalic and atraumatic.   Right Ear: Tympanic membrane normal.   Left Ear: Tympanic membrane normal.   Mouth/Throat: Oropharynx is clear and moist and mucous membranes are normal. No oropharyngeal exudate, posterior oropharyngeal edema, posterior oropharyngeal erythema or tonsillar abscesses.   Eyes: Conjunctivae and EOM are normal. Pupils are equal, round, and reactive to light. No scleral icterus.   Neck: Normal range of motion. Neck supple.   Cardiovascular: Normal rate, regular rhythm and normal heart sounds. Exam reveals no gallop and no friction rub.    No murmur heard.  Pulmonary/Chest: Breath sounds normal. No respiratory distress. He has no wheezes. He has no rhonchi. He has no rales.   Abdominal: Soft. Bowel sounds are normal. He exhibits no distension. There is generalized tenderness. There is no rebound and no guarding.   Musculoskeletal: Normal range of motion. He exhibits no edema or tenderness.   Neurological: He is alert and oriented to person, place, and time. He has normal strength. No cranial nerve deficit or sensory deficit. GCS score is 15. GCS eye subscore is 4. GCS verbal subscore is 5. GCS motor subscore is 6.   Strength 5/5 upper and lower extremities bilaterally.   Skin: Skin is warm and dry.         ED Course   Procedures  Labs Reviewed    INFLUENZA A & B BY MOLECULAR - Abnormal; Notable for the following components:       Result Value    Influenza B, Molecular Positive (*)     All other components within normal limits   CBC W/ AUTO DIFFERENTIAL - Abnormal; Notable for the following components:    Mean Corpuscular Hemoglobin Conc 31.9 (*)     Platelets 135 (*)     All other components within normal limits   BASIC METABOLIC PANEL   CK   BLUE-TOP TUBE   GOLD-TOP TUBE   RED-TOP TUBE          Imaging Results          CTA Brain (Final result)  Result time 01/22/20 00:02:23    Final result by Tonya Saenz MD (01/22/20 00:02:23)                 Impression:      No acute abnormality. No high-grade stenosis or major vessel occlusion.    Acute right maxillary and bilateral sphenoid sinusitis.      Electronically signed by: Tonya Saenz  Date:    01/22/2020  Time:    00:02             Narrative:    EXAMINATION:  CTA HEAD    CLINICAL HISTORY:  Headache, acute, severe, thunderclap, worst HA of life;FHX brain aneurysms;    TECHNIQUE:  Non contrast low dose axial images were obtained though the head. CT angiogram was performed from the level of the bottom of C2 to the top of the head following the IV administration of 100 mL of Omnipaque 350.   Sagittal and coronal reconstructions and maximum intensity projection reconstructions were performed.    COMPARISON:  04/22/2018 CT head    FINDINGS:  Intracranial Compartment:    Ventricles and sulci are normal in size for age without evidence of hydrocephalus. No extra-axial blood or fluid collections.    The brain parenchyma appears normal. No parenchymal mass, hemorrhage, edema, or major vascular distribution infarct.    Skull/Extracranial Contents (limited evaluation): No fracture.  Small fluid levels are seen in the right maxillary and bilateral sphenoid sinuses.    Intracranial Arteries: No focal high-grade stenosis, occlusion, or aneurysm.    Venous structures (limited evaluation): Normal.                                  Medical Decision Making:   History:   Old Medical Records: I decided to obtain old medical records.  Initial Assessment:   34-year-old healthy male presents complaining of myalgias, headache, cough and subjective fever that has been present for approximately 3 days.  Initial vital signs within normal limits. Physical exam was unremarkable.  Clinical Tests:   Lab Tests: Ordered and Reviewed  Radiological Study: Ordered and Reviewed  ED Management:  At the time of my assessment the patient was very concerned that his bitemporal headache was secondary to an aneurysm.  He does have a family history of brain aneurysms.  To me his presentation appeared to be more consistent with a viral process, however, a CTA of the brain was obtained for the patient's reassurance.  CT a showed no evidence of a vascular abnormality.  The only acute finding was maxillary and sphenoid sinusitis.  Labs were also obtained and without significant abnormalities.  Flu swab was positive for influenza B.  The patient received IV fluids and Fioricet in the emergency department with improvement in his symptoms. At this time the patient will be discharged home stable condition with instructions on supportive care.  PCP follow-up in 1 week as needed if symptoms persist.            Scribe Attestation:   Scribe #1: I performed the above scribed service and the documentation accurately describes the services I performed. I attest to the accuracy of the note.    Attending Attestation:           Physician Attestation for Scribe:  Physician Attestation Statement for Scribe #1: I, Dr. Gonzáles, reviewed documentation, as scribed by Mercedes Cowan in my presence, and it is both accurate and complete.                               Clinical Impression:     1. Influenza B                            Eileen Gonzáles MD  01/22/20 0017

## 2020-10-28 ENCOUNTER — CLINICAL SUPPORT (OUTPATIENT)
Dept: URGENT CARE | Facility: CLINIC | Age: 35
End: 2020-10-28
Payer: MEDICAID

## 2020-10-28 DIAGNOSIS — Z11.59 SCREENING FOR VIRAL DISEASE: Primary | ICD-10-CM

## 2020-10-28 LAB
CTP QC/QA: YES
SARS-COV-2 RDRP RESP QL NAA+PROBE: NEGATIVE

## 2020-10-28 PROCEDURE — U0002 COVID-19 LAB TEST NON-CDC: HCPCS | Mod: QW,S$GLB,, | Performed by: PHYSICIAN ASSISTANT

## 2020-10-28 PROCEDURE — U0002: ICD-10-PCS | Mod: QW,S$GLB,, | Performed by: PHYSICIAN ASSISTANT

## 2020-11-15 ENCOUNTER — HOSPITAL ENCOUNTER (EMERGENCY)
Facility: OTHER | Age: 35
Discharge: HOME | End: 2020-11-15
Attending: EMERGENCY MEDICINE
Payer: MEDICAID

## 2020-11-15 ENCOUNTER — TELEPHONE (OUTPATIENT)
Dept: EMERGENCY MEDICINE | Facility: HOSPITAL | Age: 35
End: 2020-11-15

## 2020-11-15 VITALS
WEIGHT: 245 LBS | BODY MASS INDEX: 36.29 KG/M2 | RESPIRATION RATE: 18 BRPM | HEART RATE: 90 BPM | HEIGHT: 69 IN | SYSTOLIC BLOOD PRESSURE: 122 MMHG | DIASTOLIC BLOOD PRESSURE: 76 MMHG | TEMPERATURE: 98 F | OXYGEN SATURATION: 96 %

## 2020-11-15 DIAGNOSIS — M25.562 ACUTE PAIN OF LEFT KNEE: ICD-10-CM

## 2020-11-15 DIAGNOSIS — M79.671 ACUTE FOOT PAIN, RIGHT: ICD-10-CM

## 2020-11-15 DIAGNOSIS — R20.2 PARESTHESIA OF RIGHT FOOT: ICD-10-CM
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
## 2020-11-15 DIAGNOSIS — R76.8 HEPATITIS C ANTIBODY POSITIVE IN BLOOD: Primary | ICD-10-CM

## 2020-11-15 DIAGNOSIS — M25.532 ACUTE PAIN OF LEFT WRIST: ICD-10-CM

## 2020-11-15 LAB
HCV AB SERPL QL IA: POSITIVE
HIV 1+2 AB+HIV1 P24 AG SERPL QL IA: NEGATIVE

## 2020-11-15 PROCEDURE — 86703 HIV-1/HIV-2 1 RESULT ANTBDY: CPT

## 2020-11-15 PROCEDURE — 86803 HEPATITIS C AB TEST: CPT

## 2020-11-15 PROCEDURE — 25000003 PHARM REV CODE 250: Performed by: EMERGENCY MEDICINE

## 2020-11-15 PROCEDURE — 99284 EMERGENCY DEPT VISIT MOD MDM: CPT | Mod: 25

## 2020-11-15 RX ORDER — METHOCARBAMOL 750 MG/1
1500 TABLET, FILM COATED ORAL
Status: COMPLETED | OUTPATIENT
Start: 2020-11-15 | End: 2020-11-15

## 2020-11-15 RX ORDER — METHOCARBAMOL 750 MG/1
1500 TABLET, FILM COATED ORAL 3 TIMES DAILY PRN
Qty: 30 TABLET | Refills: 0 | Status: SHIPPED | OUTPATIENT
Start: 2020-11-15 | End: 2020-11-20

## 2020-11-15 RX ORDER — LIDOCAINE 50 MG/G
PATCH TOPICAL
Qty: 30 PATCH | Refills: 0 | Status: SHIPPED | OUTPATIENT
Start: 2020-11-15 | End: 2022-04-27

## 2020-11-15 RX ORDER — NAPROXEN 500 MG/1
500 TABLET ORAL
Status: COMPLETED | OUTPATIENT
Start: 2020-11-15 | End: 2020-11-15

## 2020-11-15 RX ORDER — NAPROXEN 500 MG/1
500 TABLET ORAL 2 TIMES DAILY PRN
Qty: 60 TABLET | Refills: 0 | Status: SHIPPED | OUTPATIENT
Start: 2020-11-15 | End: 2022-04-27

## 2020-11-15 RX ADMIN — METHOCARBAMOL 1500 MG: 750 TABLET ORAL at 07:11

## 2020-11-15 RX ADMIN — NAPROXEN 500 MG: 500 TABLET ORAL at 07:11
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
## 2020-11-15 NOTE — DISCHARGE INSTRUCTIONS
Call your primary care doctor to make the first available appointment.     Keep all your medical appointments.     Take your regular medication as prescribed. Contact your primary care provider before running out of medication, or for any problems obtaining them.    Do not drive or operate heavy machinery while taking opioid or muscle relaxing medications. Examples include norco, percocet, xanax, valium, flexeril.     Overuse or long term use of pain and sedating medication may lead to addiction, dependence, organ failure, family and work problems, legal problems, accidental overdose and death.    If you do not have health insurance, you probably can afford it:  Call 1-815.349.3462 ([facility] hotline) or go to www.[employer].[place].gov    Your evaluation in the ED does not suggest any emergent or life threatening medical condition requiring admission or immediate intervention beyond that provided in the ED.   However, the signs of a serious problem sometimes take more time to appear.   RETURN TO THE ER if any of the following occur:    Weakness, dizziness, fainting, or loss of consciousness   Fever of 100.4ºF (38ºC) or higher  Any worse symptoms  Any new or concerning symptoms    To protect yourself and others from COVID19:  Minimize trips outside of home.  Wear a mask whenever outside your home.   Wear a mask whenever with people you do not live with.  Stay at least 6 feet from others you do not live with (inside or outside).  Avoid crowds or crowded places.  Wash hands frequently for at least 20 seconds at a time.  Quarantine for 14 days if you are exposed to someone with cold, flu, or COVID19 symptoms.   Even if you or they had a negative COVID19 test   Even if you have no symptoms   Otherwise you could give the virus to someone who dies from it  Some symptoms of COVID19 are fever, cough, sore throat, breathing troubles, loss of taste/smell, stomach upset, diarrhea.   Disinfect surfaces that are touched a lot (includes your  "phone, handles, switches, etc).       Unemployment:  Those who have lost all or some of their work are eligible for state unemployment ($247 weekly before tax). This includes independent contractors, gig workers, and those unemployed pre-COVID.   [place] Unemployment Hotline: Mon-Fri 830am-430pm. 547.908.8905, 410.238.5090, and 241-736-4188. Due to long phone hold times, we recommend applying online at www.[employer]. If you need help with internet access for the application, call 950-119-4427.     Voting:  You can register to vote if you are a [place] citizen and are over 18 years old.   Text "vote health" to [phone] to register or request an absentee/mail-in ballot.     "
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
## 2020-12-02 ENCOUNTER — TELEPHONE (OUTPATIENT)
Dept: HEPATOLOGY | Facility: CLINIC | Age: 35
End: 2020-12-02

## 2020-12-02 ENCOUNTER — TELEPHONE (OUTPATIENT)
Dept: TRANSPLANT | Facility: CLINIC | Age: 35
End: 2020-12-02

## 2020-12-02 DIAGNOSIS — B18.2 CHRONIC HEPATITIS C WITHOUT HEPATIC COMA: Primary | ICD-10-CM

## 2020-12-02 NOTE — TELEPHONE ENCOUNTER
Pt records reviewed.   Pt will be referred to Hepatitis C clinic    Initial referral received  from the workque.   Referring Provider/diagnosis  FNP Starla Mcginnis.   HEP C Ab+      Referral letter sent to  patient.

## 2020-12-02 NOTE — LETTER
December 2, 2020    Ignacio Su  [address] [facility] [address]      Dear Ignacio Su:    Your doctor has referred you to the [facility]. We are sending this letter to remind you to make an appointment with us to complete the referral process.     Please call us at [phone] to schedule an appointment. We look forward to seeing you soon.     If you received a call and have been scheduled, please disregard this letter.       Sincerely,        [facility]   [facility] [place] [address]  [phone]

## 2020-12-02 NOTE — TELEPHONE ENCOUNTER
----- Message from Savi Matthews sent at 12/2/2020 10:45 AM CST -----  Pt records reviewed.   Pt will be referred to Hepatitis C clinic    Initial referral received  from the workque.   Referring Provider/diagnosis  FNP Starla Mcginnis.   HEP C Ab+      Referral letter sent to  patient.

## 2020-12-02 NOTE — TELEPHONE ENCOUNTER
Patient hep c antibody (+).  Need to scheduled hep c beka to confirm active virus before scheduling consult visit with PA Scheuermann.  Attempt made to reach patient at both numbers listed.  Unable to LVM. Letter sent asking that he contact us regarding consult visit.

## 2020-12-02 NOTE — TELEPHONE ENCOUNTER
----- Message from Young Cortes sent at 12/2/2020  9:49 AM CST -----  Regarding: Patient referral  Pt had positive HCV Ab test at [facility] on 11/15. Referral placed by FNTY Mcginnis.    Patient is aware he needs to have an RNA test but has yet to come in to have it done.    Thank you!

## 2021-02-09 ENCOUNTER — TELEPHONE (OUTPATIENT)
Dept: HEPATOLOGY | Facility: CLINIC | Age: 36
End: 2021-02-09

## 2021-02-09 DIAGNOSIS — R76.8 HEPATITIS C ANTIBODY TEST POSITIVE: Primary | ICD-10-CM

## 2022-04-27 ENCOUNTER — HOSPITAL ENCOUNTER (EMERGENCY)
Facility: OTHER | Age: 37
Discharge: HOME | End: 2022-04-27
Attending: EMERGENCY MEDICINE
Payer: MEDICAID

## 2022-04-27 VITALS
TEMPERATURE: 99 F | HEIGHT: 71 IN | BODY MASS INDEX: 35 KG/M2 | RESPIRATION RATE: 18 BRPM | OXYGEN SATURATION: 99 % | SYSTOLIC BLOOD PRESSURE: 139 MMHG | WEIGHT: 250 LBS | HEART RATE: 83 BPM | DIASTOLIC BLOOD PRESSURE: 92 MMHG

## 2022-04-27 DIAGNOSIS — S99.922A FOOT INJURY, LEFT, INITIAL ENCOUNTER: ICD-10-CM

## 2022-04-27 DIAGNOSIS — S92.155A CLOSED NONDISPLACED AVULSION FRACTURE OF LEFT TALUS, INITIAL ENCOUNTER: Primary | ICD-10-CM

## 2022-04-27 PROCEDURE — 25000003 PHARM REV CODE 250: Performed by: PHYSICIAN ASSISTANT

## 2022-04-27 PROCEDURE — 99284 EMERGENCY DEPT VISIT MOD MDM: CPT | Mod: 25

## 2022-04-27 RX ORDER — HYDROCODONE BITARTRATE AND ACETAMINOPHEN 5; 325 MG/1; MG/1
1 TABLET ORAL EVERY 4 HOURS PRN
Qty: 12 TABLET | Refills: 0 | Status: SHIPPED | OUTPATIENT
Start: 2022-04-27

## 2022-04-27 RX ORDER — IBUPROFEN 600 MG/1
600 TABLET ORAL EVERY 6 HOURS PRN
Qty: 20 TABLET | Refills: 0 | Status: SHIPPED | OUTPATIENT
Start: 2022-04-27 | End: 2024-01-02 | Stop reason: ALTCHOICE

## 2022-04-27 RX ORDER — IBUPROFEN 600 MG/1
600 TABLET ORAL
Status: COMPLETED | OUTPATIENT
Start: 2022-04-27 | End: 2022-04-27

## 2022-04-27 RX ORDER — OXYCODONE HYDROCHLORIDE 5 MG/1
5 TABLET ORAL
Status: COMPLETED | OUTPATIENT
Start: 2022-04-27 | End: 2022-04-27

## 2022-04-27 RX ADMIN — IBUPROFEN 600 MG: 600 TABLET ORAL at 12:04

## 2022-04-27 RX ADMIN — OXYCODONE 5 MG: 5 TABLET ORAL at 12:04
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
## 2022-04-27 NOTE — ED NOTES
LOC: The patient is awake, alert, and oriented to self, place, time, and situation. Pt is calm and cooperative. Affect is appropriate.  Speech is appropriate and clear.     APPEARANCE: Patient resting on stretcher in no acute distress.  Patient is clean and well groomed.    SKIN: The skin is warm and dry; color consistent with ethnicity.  Patient has normal skin turgor and moist mucus membranes.  Skin intact; no breakdown or bruising noted.     MUSCULOSKELETAL: Patient moving upper and lower extremities without difficulty; denies pain in the extremities or back.  Denies weakness.     RESPIRATORY: Airway is open and patent. Respirations spontaneous, even, easy, and non-labored.  Patient has a normal effort and rate.  No accessory muscle use noted. Denies cough.     CARDIAC:  Normal rate noted.  No peripheral edema noted. No complaints of chest pain.      ABDOMEN: Soft and non tender to palpation.  No distention noted. Pt denies abdominal pain; denies nausea, vomiting, diarrhea, or constipation.    NEUROLOGIC: Eyes open spontaneously.  Behavior appropriate to situation.  Follows commands; facial expression symmetrical.  Purposeful motor response noted; normal sensation in all extremities. Pt denies headache; denies lightheadedness or dizziness; denies visual disturbances; denies loss of balance; denies unilateral weakness.

## 2022-04-27 NOTE — ED TRIAGE NOTES
Pt presents to the ER with complaints of pain and decreased sensation to left foot and ankle. Pt states he injured his ankle approximately two weeks ago when a piece of equipment fell onto his ankle. Pt reporting no improvement in pain and swelling; wound noted to the anterior aspect of the left ankle; no bleeding; small amount of exudate noted. Pt denies fever or chills.

## 2022-04-27 NOTE — ED PROVIDER NOTES
"     Source of History:  Patient    Chief complaint:  Foot Pain (Work injury 2 weeks PTA which was evaluated at [facility] facility, no fx. Pt endorses persisting pain to LLE. Pt walks with a limp. Able to wiggle toes, full sensation. )      HPI:  Ignacio Su is a 36 y.o. male who is presenting to the emergency department with left foot pain and swelling.  Patient states he had an injury at work approximately 2 weeks ago where heavy object dropped on to the dorsal aspect of his left foot.  He was evaluated at [facility] 2 weeks ago and had negative x-rays.  He states since then he has been bearing weight but walking with a limp.  He states area is swollen.  There was also a break in the skin.  This is the extent to the patients complaints today here in the emergency department.    ROS: As per HPI and below:  General: No fever.  No chills.  Neurologic: No focal weakness.  No numbness.  MSK: + left foot pain and swelling  Integument: + abrasion to left foot.  Allergy/immunology:  Not immunocompromised.    Review of patient's allergies indicates:  No Known Allergies    PMH:  As per HPI and below:  Past Medical History:   Diagnosis Date    GERD (gastroesophageal reflux disease)     Hemorrhoids     Internal hemorrhoids      History reviewed. No pertinent surgical history.    Social History     Tobacco Use    Smoking status: Former Smoker     Types: Cigars    Smokeless tobacco: Current User    Tobacco comment: doesnt want to quit   Substance Use Topics    Alcohol use: Yes     Comment: a bottle of liquor a day    Drug use: No       Physical Exam:    BP (!) 139/92   Pulse 83   Temp 98.5 °F (36.9 °C)   Resp 18   Ht 5' 11" (1.803 m)   Wt 113.4 kg (250 lb)   SpO2 99%   BMI 34.87 kg/m²   Nursing note and vital signs reviewed.  Appearance: No acute distress.  Eyes: No conjunctival injection.  ENT: Normal phonation.  Cardiac:  2+ pedal pulses bilaterally.  Musculoskeletal:  Left foot/ankle:  Diffuse edema noted to " the dorsum of hindfoot and midfoot.  Tenderness to the hindfoot near the talus bone.  Preserved reduced range of motion with dorsiflexion, plantar flexion, eversion and inversion secondary a pain.  Normal range of motion of toes.  Skin:  Cap refill less than 2 seconds.  Healing, 3cm  abrasion to the dorsum of the left foot  Mental Status:  Alert and oriented x 3.  Appropriate, conversant.  Labs that have been ordered have been independently reviewed and interpreted by myself.    I decided to obtain the patient's medical records.    MDM/ Differential Dx:    36 y.o. male presenting to the emergency department with left foot pain and swelling that has persisted since injury at work from direct trauma approximately 2 weeks ago.  He is afebrile, nontoxic appearing hemodynamically stable.  Limb is distally neurovascular intact.  X-ray reveals a likely avulsion fracture near the did tell or navicular joint.  He has preserved range of motion and has been bearing weight.  Patient was placed in a ortho boot.  Advised to bear weight as tolerated with progression.  Given permission follow-up with ortho           Diagnostic Impression:    1. Closed nondisplaced avulsion fracture of left talus, initial encounter    2. Foot injury, left, initial encounter         ED Disposition Condition    Discharge Stable               Vitaliy Bangura PA-C  04/27/22 0936

## 2022-04-27 NOTE — DISCHARGE INSTRUCTIONS
Tried bearing weight on her toes and then bear weight as tolerated.  If you are unable to progress weight-bearing, you can use crutches (purchased from [employer] or [employer]).  Take narcotic pain medicine for breakthrough pain.  If Tylenol and Motrin do not relieved pain

## 2022-07-10 ENCOUNTER — HOSPITAL ENCOUNTER (EMERGENCY)
Facility: OTHER | Age: 37
Discharge: HOME | End: 2022-07-10
Attending: EMERGENCY MEDICINE
Payer: MEDICAID

## 2022-07-10 VITALS
SYSTOLIC BLOOD PRESSURE: 140 MMHG | DIASTOLIC BLOOD PRESSURE: 77 MMHG | TEMPERATURE: 98 F | HEIGHT: 71 IN | HEART RATE: 82 BPM | BODY MASS INDEX: 35.7 KG/M2 | OXYGEN SATURATION: 99 % | RESPIRATION RATE: 18 BRPM | WEIGHT: 255 LBS

## 2022-07-10 DIAGNOSIS — Z20.822 EXPOSURE TO COVID-19 VIRUS: Primary | ICD-10-CM

## 2022-07-10 PROCEDURE — U0005 INFEC AGEN DETEC AMPLI PROBE: HCPCS | Performed by: EMERGENCY MEDICINE

## 2022-07-10 PROCEDURE — U0003 INFECTIOUS AGENT DETECTION BY NUCLEIC ACID (DNA OR RNA); SEVERE ACUTE RESPIRATORY SYNDROME CORONAVIRUS 2 (SARS-COV-2) (CORONAVIRUS DISEASE [COVID-19]), AMPLIFIED PROBE TECHNIQUE, MAKING USE OF HIGH THROUGHPUT TECHNOLOGIES AS DESCRIBED BY CMS-2020-01-R: HCPCS | Performed by: EMERGENCY MEDICINE

## 2022-07-10 PROCEDURE — 87522 HEPATITIS C REVRS TRNSCRPJ: CPT | Performed by: NURSE PRACTITIONER

## 2022-07-10 PROCEDURE — 99283 EMERGENCY DEPT VISIT LOW MDM: CPT | Mod: 25

## 2022-07-10 NOTE — ED PROVIDER NOTES
Encounter Date: 7/10/2022       History     Chief Complaint   Patient presents with    COVID-19 Concerns     Exposed to COVID. Denies any symptoms. VSS.      Ignacio MICHAEL Jennieles 37 y.o. male presents with a chief complaint of COVID-19 concerns.    Requesting testing today.  Reports exposure.  Patient is asymptomatic.         This is the extent of patient's complaints for this ER encounter.         The history is provided by the patient.     Review of patient's allergies indicates:  No Known Allergies  Past Medical History:   Diagnosis Date    GERD (gastroesophageal reflux disease)     Hemorrhoids     Internal hemorrhoids      No past surgical history on file.  Family History   Problem Relation Age of Onset    Heart disease Mother     Heart disease Father      Social History     Tobacco Use    Smoking status: Former Smoker     Types: Cigars    Smokeless tobacco: Current User    Tobacco comment: doesnt want to quit   Substance Use Topics    Alcohol use: Yes     Comment: a bottle of liquor a day    Drug use: No     Review of Systems   Constitutional: Negative for fever.   HENT: Negative for congestion, rhinorrhea and sore throat.    Respiratory: Negative for cough and shortness of breath.    Cardiovascular: Negative for chest pain.   Gastrointestinal: Negative for abdominal pain.   Genitourinary: Negative for difficulty urinating.   Musculoskeletal: Negative for arthralgias and myalgias.   Skin: Negative for rash and wound.   Neurological: Negative for headaches.   Psychiatric/Behavioral: Negative.        Physical Exam     Initial Vitals [07/10/22 1141]   BP Pulse Resp Temp SpO2   (!) 140/77 82 18 98.1 °F (36.7 °C) 99 %      MAP       --         Physical Exam    Nursing note and vitals reviewed.  Constitutional: No distress.   HENT:   Head: Normocephalic and atraumatic.   Mouth/Throat: Mucous membranes are normal.   Eyes: Conjunctivae and lids are normal. Pupils are equal.   Cardiovascular: Normal rate.    Pulmonary/Chest: Effort normal. No respiratory distress.   Musculoskeletal:         General: Normal range of motion.     Neurological: He is alert and oriented to person, place, and time.   Skin: Skin is warm and dry.   Psychiatric: He has a normal mood and affect. His behavior is normal.         ED Course   Procedures  Labs Reviewed   SARS-COV-2 (COVID-19) QUALITATIVE PCR   HEPATITIS C RNA, QUANTITATIVE, PCR          Imaging Results    None          Medications - No data to display  Medical Decision Making:   Clinical Tests:   Lab Tests: Ordered  ED Management:  Patient is requesting routine COVID swab.  Reports exposure without symptoms.  Family member is being induced tomorrow, and would like to know if he is COVID positive.    Noted to be hep C positive without follow-up.  Additional lab work ordered as requested per team.                       Clinical Impression:   Final diagnoses:  [Z20.822] Exposure to COVID-19 virus (Primary)          ED Disposition Condition    Discharge Stable        ED Prescriptions     None        Follow-up Information     Follow up With Specialties Details Why Contact Info    Primary care   As needed            Juanis Walker NP  07/10/22 2223

## 2022-07-11 LAB
SARS-COV-2 RNA RESP QL NAA+PROBE: NOT DETECTED
SARS-COV-2- CYCLE NUMBER: NORMAL

## 2022-07-12 ENCOUNTER — PATIENT OUTREACH (OUTPATIENT)
Dept: EMERGENCY MEDICINE | Facility: OTHER | Age: 37
End: 2022-07-12
Payer: MEDICAID

## 2022-07-12 LAB — HCV RNA SERPL NAA+PROBE-ACNC: NORMAL IU/ML

## 2022-07-12 NOTE — PROGRESS NOTES
Patient's number is not working. ED Navigator unable to reach patient for navigation services. Closing encounter.

## 2024-01-02 ENCOUNTER — TELEPHONE (OUTPATIENT)
Dept: ORTHOPEDICS | Facility: CLINIC | Age: 39
End: 2024-01-02
Payer: MEDICAID

## 2024-01-02 ENCOUNTER — HOSPITAL ENCOUNTER (EMERGENCY)
Facility: OTHER | Age: 39
Discharge: HOME | End: 2024-01-02
Attending: EMERGENCY MEDICINE

## 2024-01-02 VITALS
WEIGHT: 240 LBS | OXYGEN SATURATION: 98 % | SYSTOLIC BLOOD PRESSURE: 138 MMHG | RESPIRATION RATE: 19 BRPM | TEMPERATURE: 98 F | BODY MASS INDEX: 34.36 KG/M2 | HEART RATE: 90 BPM | DIASTOLIC BLOOD PRESSURE: 89 MMHG | HEIGHT: 70 IN

## 2024-01-02 DIAGNOSIS — R76.8 POSITIVE HEPATITIS C ANTIBODY TEST: ICD-10-CM

## 2024-01-02 DIAGNOSIS — J10.1: Primary | ICD-10-CM

## 2024-01-02 LAB
CTP QC/QA: YES
CTP QC/QA: YES
POC MOLECULAR INFLUENZA A AGN: POSITIVE
POC MOLECULAR INFLUENZA B AGN: NEGATIVE
SARS-COV-2 RDRP RESP QL NAA+PROBE: NEGATIVE

## 2024-01-02 PROCEDURE — 87635 SARS-COV-2 COVID-19 AMP PRB: CPT | Performed by: EMERGENCY MEDICINE

## 2024-01-02 PROCEDURE — 99284 EMERGENCY DEPT VISIT MOD MDM: CPT

## 2024-01-02 PROCEDURE — 25000003 PHARM REV CODE 250: Performed by: EMERGENCY MEDICINE

## 2024-01-02 RX ORDER — BENZONATATE 100 MG/1
100 CAPSULE ORAL
Status: COMPLETED | OUTPATIENT
Start: 2024-01-02 | End: 2024-01-02

## 2024-01-02 RX ORDER — NAPROXEN 500 MG/1
500 TABLET ORAL 2 TIMES DAILY PRN
Qty: 28 TABLET | Refills: 0 | Status: SHIPPED | OUTPATIENT
Start: 2024-01-02 | End: 2024-01-16

## 2024-01-02 RX ORDER — BENZONATATE 100 MG/1
100 CAPSULE ORAL 3 TIMES DAILY PRN
Qty: 20 CAPSULE | Refills: 0 | Status: SHIPPED | OUTPATIENT
Start: 2024-01-02 | End: 2024-01-12

## 2024-01-02 RX ORDER — OXYMETAZOLINE HCL 0.05 %
1 SPRAY, NON-AEROSOL (ML) NASAL
Status: COMPLETED | OUTPATIENT
Start: 2024-01-02 | End: 2024-01-02

## 2024-01-02 RX ORDER — GUAIFENESIN 1200 MG/1
1 TABLET, EXTENDED RELEASE ORAL 2 TIMES DAILY PRN
Qty: 12 TABLET | Refills: 0 | Status: SHIPPED | OUTPATIENT
Start: 2024-01-02 | End: 2024-01-07

## 2024-01-02 RX ORDER — OSELTAMIVIR PHOSPHATE 75 MG/1
75 CAPSULE ORAL 2 TIMES DAILY
Qty: 10 CAPSULE | Refills: 0 | Status: SHIPPED | OUTPATIENT
Start: 2024-01-02 | End: 2024-01-07

## 2024-01-02 RX ORDER — NAPROXEN 500 MG/1
500 TABLET ORAL
Status: COMPLETED | OUTPATIENT
Start: 2024-01-02 | End: 2024-01-02

## 2024-01-02 RX ORDER — OXYMETAZOLINE HCL 0.05 %
1 SPRAY, NON-AEROSOL (ML) NASAL 2 TIMES DAILY
Qty: 15 ML | Refills: 0 | Status: SHIPPED | OUTPATIENT
Start: 2024-01-02 | End: 2024-01-05

## 2024-01-02 RX ADMIN — BENZONATATE 100 MG: 100 CAPSULE ORAL at 04:01

## 2024-01-02 RX ADMIN — Medication 1 SPRAY: at 04:01

## 2024-01-02 RX ADMIN — NAPROXEN 500 MG: 500 TABLET ORAL at 04:01

## 2024-01-02 NOTE — Clinical Note
"Ignacio Hernandez" Sharlene was seen and treated in our emergency department on 1/2/2024.  He may return to work on 01/08/2024.       If you have any questions or concerns, please don't hesitate to call.      Calderon Mann MD"

## 2024-01-02 NOTE — DISCHARGE INSTRUCTIONS
Thank you for letting us take care of you today! It was nice meeting you, and I hope you feel better soon.     Call your primary care doctor to make the first available appointment.     Keep all your medical appointments.     Take your regular medication as prescribed. Contact your primary care provider before running out of medication, or for any problems obtaining them.    Do not drive or operate heavy machinery while taking opioid or muscle relaxing medications. Examples include norco, percocet, xanax, valium, flexeril.     Overuse or long term use of pain and sedating medication may lead to addiction, dependence, organ failure, family and work problems, legal problems, accidental overdose, and death.    If you do not have health insurance, you probably can afford it:  Call [phone] ([facility] hotline) or go to www.[place].gov    Your evaluation in the ER does not suggest any emergent or life threatening medical condition requiring admission or immediate intervention beyond that provided in the ER.   However, the signs of a serious problem sometimes take more time to appear.     Do not hesitate to return to the ER if any of the following occur:    Weakness, dizziness, fainting, or loss of consciousness   Fever of 100.4ºF (38ºC) or higher  Any worse symptoms  Any new or concerning symptoms    Overview  Influenza (flu) is an infection in the lungs and breathing passages. It is caused by the influenza virus. There are different strains, or types, of the flu virus from year to year. Unlike the common cold, the flu comes on suddenly and the symptoms can be more severe. These symptoms include a cough, congestion, fever, chills, fatigue, aches, and pains. These symptoms may last for a few weeks. Although the flu can make you feel very sick, it usually doesn't cause serious health problems.    Home treatment is usually all you need for flu symptoms. But your doctor may prescribe antiviral medicine to prevent other  health problems, such as pneumonia, from developing. The risk of other health problems from the flu is highest for young children (under 2), older adults (over 65), pregnant women, and people with long-term health conditions.    Follow-up care is a key part of your treatment and safety. Be sure to make and go to all appointments, and call your doctor if you are having problems. It's also a good idea to know your test results and keep a list of the medicines you take.    How can you care for yourself at home?  Get plenty of rest.  Drink plenty of fluids. If you have to limit fluids because of a health problem, talk with your doctor before you increase the amount of fluids you drink.  Take an over-the-counter pain medicine if needed, such as acetaminophen (Tylenol), ibuprofen (Advil, Motrin), or naproxen (Aleve), to relieve fever, headache, and muscle aches. Be safe with medicines. Read and follow all instructions on the label.  No one younger than 20 should take aspirin. It has been linked to Reye syndrome, a serious illness.  Take any prescribed medicine exactly as directed.  Do not smoke. Smoking can make the flu worse. If you need help quitting, talk to your doctor about stop-smoking programs and medicines. These can increase your chances of quitting for good.  If the skin around your nose and lips becomes sore, put some petroleum jelly (such as Vaseline) on the area.  To ease coughing:  Suck on cough drops or plain, hard candy.  Try an over-the-counter cough or cold medicine. Read and follow all instructions on the label.  Raise your head at night with an extra pillow. This may help you rest if coughing keeps you awake.    To avoid spreading the flu  Wash your hands regularly, and keep your hands away from your face.  Stay home from school, work, and other public places until you are feeling better and your fever has been gone for at least 24 hours. The fever needs to have gone away on its own without the help of  medicine.  Ask people living with you to talk to their doctors about preventing the flu. They may get antiviral medicine to keep from getting the flu from you.  To prevent the flu in the future, get the flu vaccine every fall. Encourage people living with you to get the vaccine.  Cover your mouth when you cough or sneeze. If you can, cough or sneeze into the bend of your elbow, not your hands.      When should you call for help?  Call 911 anytime you think you may need emergency care. For example, call if:    You have severe trouble breathing.  You have a seizure.    Call your doctor now or seek immediate medical care if:    You have trouble breathing.  You have a fever with a stiff neck or a severe headache.  You have pain or pressure in your chest or belly.  You have a fever or cough that returns after getting better.  You feel very sleepy, dizzy, or confused.  You are not urinating.  You have severe muscle pain.  You have severe weakness, or you are unsteady.  You have medical conditions that are getting worse.    Watch closely for changes in your health, and be sure to contact your doctor if:    You do not get better as expected.  You are having a problem with your medicine.

## 2024-01-02 NOTE — ED PROVIDER NOTES
"  Source of History:  Medical record, patient      Chief complaint:  Per triage note: "Generalized Body Aches (Pt c/o generalized body aches, runny nose, cough, sore throat, and congestion today. Has been caring for child at home with Flu. Denies fevers. Reproducible pain with cough. )  "    HPI:    Patient presents for evaluation of acute onset onset rhinorrhea, cough, sore throat, myalgias since earlier today.  Yesterday he took care of his grandchild who tested positive for influenza.  Patient has not been vaccinated against influenza this season.  He reports chest pain only when coughing. He denies any fevers.       ROS:   See HPI for pertinent review of systems    Review of patient's allergies indicates:  No Known Allergies    PMH:  As per HPI and below:  Past Medical History:   Diagnosis Date    GERD (gastroesophageal reflux disease)     Hemorrhoids     Internal hemorrhoids        No past surgical history on file.    Social History     Tobacco Use    Smoking status: Former     Types: Cigars    Smokeless tobacco: Current    Tobacco comments:     doesnt want to quit   Substance Use Topics    Alcohol use: Yes     Comment: a bottle of liquor a day    Drug use: No       Physical Exam:      Nursing note and vitals reviewed.  /89   Pulse 90   Temp 98.2 °F (36.8 °C) (Oral)   Resp 19   Ht 5' 10" (1.778 m)   Wt 108.9 kg (240 lb)   SpO2 98%   BMI 34.44 kg/m²     Constitutional: No distress.  Intermittent cough.  Eyes: EOMI. No discharge. Anicteric.  HENT:   Neck: Normal range of motion. Neck supple.  Cardiovascular: Normal rate. No murmur, no gallop and no friction rub heard.   Pulmonary/Chest: No respiratory distress. Effort normal. No wheezes, no rales, no rhonchi.   Abdominal: Bowel sounds normal. Soft. No distension and no mass. There is no tenderness. There is no rebound, no guarding, no tenderness at McBurney's point.  Neurological: GCS 15. Alert and oriented to person, place, and time. No gross " cranial nerve, light touch or strength deficit. Coordination normal.   Skin: Skin is warm and dry.   EXT: 2+ radial pulses.   Psychiatric: Behavior is normal. Judgment normal.        Medical Decision Making / Independent Interpretations / External Records Reviewed:      Pt is a 38 y.o. M with GERD who presents with acute onset onset rhinorrhea, cough, sore throat, myalgias since earlier today one day after helping care for his young grandchild that tested positive for influenza.   Patient well-appearing on exam.  The initial differential included influenza, COVID-19, other acute viral syndrome.  I considered but doubt pneumonia.  I independently reviewed and interpreted labs which are notable for positive influenza.   I explained their diagnosis of influenza, and the risks and benefits of addition of antiviral therapy to maximal supportive therapy, offered initial dose here. After a detailed discussion of these, pt declines antiviral therapy here, will consider filling Rx.   After pt left, I found he was lost to followup after positive Hep C antibody in 2020. I contacted social work to attempt to reach pt to re-establish f/u.   --  I discussed with pt and/or guardian/caretaker that this evaluation in the ED does not suggest any emergent or life threatening medical condition requiring admission or further immediate intervention or diagnostics. Regardless, an unremarkable evaluation in the ED does not preclude the development or presence of a serious or life threatening condition. Pt was instructed to return for any worsening, new, changed, or concerning symptoms.     I had a detailed discussion with patient and/or guardian/caretaker regarding findings, plan, return precautions, importance of medication adherence, need to follow-up with a PCP and specialist. All questions answered.     Note was created using voice recognition software. It may have occasional typographical errors not identified and edited despite  initial review prior to signing.    .           --  I decided to obtain the patient's medical records. I reviewed patient's prior external notes / results: specialist documentation   .   --  Additional Medical Decision Making: Prescription drug management    Medications   benzonatate capsule 100 mg (100 mg Oral Given 1/2/24 4465)   naproxen tablet 500 mg (500 mg Oral Given 1/2/24 4457)   oxymetazoline 0.05 % nasal spray 1 spray (1 spray Each Nostril Given 1/2/24 9336)              No future appointments.     Diagnostic Impression:    1. Influenza due to influenza A virus with upper respiratory signs    2. Positive hepatitis C antibody test         ED Disposition Condition    Discharge Good          ED Prescriptions       Medication Sig Dispense Start Date End Date Auth. Provider    naproxen (NAPROSYN) 500 MG tablet Take 1 tablet (500 mg total) by mouth 2 (two) times daily as needed (pain). 28 tablet 1/2/2024 1/16/2024 Calderon Mann MD    benzonatate (TESSALON) 100 MG capsule Take 1 capsule (100 mg total) by mouth 3 (three) times daily as needed for Cough. 20 capsule 1/2/2024 1/12/2024 Calderon Mann MD    oseltamivir (TAMIFLU) 75 MG capsule Take 1 capsule (75 mg total) by mouth 2 (two) times daily. for 5 days 10 capsule 1/2/2024 1/7/2024 Calderon Mann MD    oxymetazoline (AFRIN) 0.05 % nasal spray 1 spray by Nasal route 2 (two) times daily. Do not use for more than 3 days at a time for 3 days 15 mL 1/2/2024 1/5/2024 Calderon Mann MD    guaiFENesin (MUCINEX) 1,200 mg Ta12 Take 1 tablet by mouth 2 (two) times daily as needed (congestion). 12 tablet 1/2/2024 1/7/2024 Calderon Mann MD          Follow-up Information       Follow up With Specialties Details Why Contact Info    Your primary care doctor  In 1 week For recheck with your primary care doctor                Calderon Mann MD  01/02/24 0605

## 2024-01-03 ENCOUNTER — TELEPHONE (OUTPATIENT)
Dept: ORTHOPEDICS | Facility: CLINIC | Age: 39
End: 2024-01-03
Payer: MEDICAID

## 2024-01-04 ENCOUNTER — TELEPHONE (OUTPATIENT)
Dept: HEPATOLOGY | Facility: CLINIC | Age: 39
End: 2024-01-04
Payer: MEDICAID

## 2024-01-04 NOTE — TELEPHONE ENCOUNTER
Dr. Calderon Mann ordered that patient be scheduled for a hepatology consult visit for hep c.  See msg from PA Scheuermann dated 2/9/21.  Patient hep c antibody positive 11/15/20 but quant negative 7/10/22.  Please advise.

## 2024-01-05 NOTE — TELEPHONE ENCOUNTER
Referred for HCV  11/2020 HCVAB positive  7/2022 HCV RNA negative      Please call patient to review the following:    Labs show he / she may have been exposed to Hepatitis C (HCV) at some point in the past.     HCV is an infection usually spread through the blood (blood transfusion before 1992, tattoos, IV drugs, snorting drugs) and occasionally spread through sex.   We will not be able to determine when this occurred.     Current blood work also shows there is no virus present in the body as of 7/2022.  This means one of three things:  He / She got rid of the virus without treatment. This happens 15-40% of the time & most people are unaware.  He / She was already treated for the virus.  The test was a false positive and he / she was never really exposed to the virus.    (It is important to know this will not protect against a repeat infection. If he / she is exposed again, a new infection could occur.)    --    An appointment in the HCV clinic is NOT needed unless he / she would like to be seen to discuss further.       Can cancel referral

## 2024-01-08 NOTE — TELEPHONE ENCOUNTER
Attempt made to reach patient by phone.  I left a brief VM.  Msg from provider mailed to him.  I stressed that he call hepatology  if needed.

## 2024-06-15 ENCOUNTER — HOSPITAL ENCOUNTER (EMERGENCY)
Facility: OTHER | Age: 39
Discharge: HOME | End: 2024-06-15
Attending: EMERGENCY MEDICINE

## 2024-06-15 VITALS
DIASTOLIC BLOOD PRESSURE: 81 MMHG | OXYGEN SATURATION: 99 % | HEART RATE: 75 BPM | HEIGHT: 70 IN | WEIGHT: 220 LBS | SYSTOLIC BLOOD PRESSURE: 132 MMHG | BODY MASS INDEX: 31.5 KG/M2 | RESPIRATION RATE: 18 BRPM | TEMPERATURE: 99 F

## 2024-06-15 DIAGNOSIS — T26.90XA CHEMICAL BURN OF EYE: Primary | ICD-10-CM

## 2024-06-15 DIAGNOSIS — H11.422 CHEMOSIS OF LEFT CONJUNCTIVA: ICD-10-CM

## 2024-06-15 PROCEDURE — 99283 EMERGENCY DEPT VISIT LOW MDM: CPT

## 2024-06-15 PROCEDURE — 25000003 PHARM REV CODE 250: Performed by: EMERGENCY MEDICINE

## 2024-06-15 RX ORDER — ERYTHROMYCIN 5 MG/G
OINTMENT OPHTHALMIC
Qty: 3.5 G | Refills: 0 | Status: SHIPPED | OUTPATIENT
Start: 2024-06-15

## 2024-06-15 RX ORDER — TETRACAINE HYDROCHLORIDE 5 MG/ML
2 SOLUTION OPHTHALMIC
Status: COMPLETED | OUTPATIENT
Start: 2024-06-15 | End: 2024-06-15

## 2024-06-15 RX ORDER — GENTAMICIN SULFATE 3 MG/ML
2 SOLUTION/ DROPS OPHTHALMIC
Status: COMPLETED | OUTPATIENT
Start: 2024-06-15 | End: 2024-06-15

## 2024-06-15 RX ADMIN — TETRACAINE HYDROCHLORIDE 2 DROP: 5 SOLUTION OPHTHALMIC at 07:06

## 2024-06-15 RX ADMIN — GENTAMICIN SULFATE 2 DROP: 3 SOLUTION/ DROPS OPHTHALMIC at 08:06

## 2024-06-15 NOTE — ED PROVIDER NOTES
"Encounter Date: 6/15/2024       History     Chief Complaint   Patient presents with    Eye Problem     Works at [employer], did not wear goggles at work yesterday. Reports "steam" coming into his eyes, L eye now red, swollen, and painful. Reports both eyes were like this after the accident, irrigated with eye drops at home. Woke up this morning with blurry vision in left eye. Coworker he was with was seen for same and dx with chemical burn to eye.      39 yo man that presents for evaluation of irritation in the left eye. He works at a [employer] shop and worked without goggles for an extended period of time which irritated his eyes lprompting him to flush his eyes with some improvement however upon waking today he noticed that he had irritation and redness in the left eye. He does not wear contacts or glasses. He had previously used some OTC eye drop which offered little if any relief.       Review of patient's allergies indicates:  No Known Allergies  Past Medical History:   Diagnosis Date    GERD (gastroesophageal reflux disease)     Hemorrhoids     Internal hemorrhoids      History reviewed. No pertinent surgical history.  Family History   Problem Relation Name Age of Onset    Heart disease Mother      Heart disease Father       Social History     Tobacco Use    Smoking status: Former     Types: Cigars    Smokeless tobacco: Current    Tobacco comments:     doesnt want to quit   Substance Use Topics    Alcohol use: Yes     Comment: occasional    Drug use: No     Review of Systems  Constitutional-no fever  HEENT-no congestion  Eyes-+ left eye irritation  Respiratory-no shortness of breath  Cardio-no chest pain  GI-no abdominal pain  Endocrine-no cold intolerance  -no difficulty urinating  MSK-no myalgias  Skin-no rashes  Allergy-no environmental allergy  Neurologic-, no headache  Hematology-no swollen nodes  Behavioral-no confusion   Physical Exam     Initial Vitals [06/15/24 0650]   BP Pulse Resp Temp SpO2 "   136/85 79 17 97.9 °F (36.6 °C) 97 %      MAP       --         Physical Exam  Constitutional: uncomfortable appearing 37 yo man in mild distress  Eyes: EOMI, pupils 3mm briskly reactive  Right eye grossly normal external exam  Left eye demonstrates mild injection, superior lid is mildly inflamed   Acuity intact to confrontation and peripherally    ENT       Head: Normocephalic, atraumatic.       Nose: Normal external appearance        Mouth/Throat: no strigulous respirations   Hematological/Lymphatic/Immunilogical: no visible lymphadenopathy   Cardiovascular: Normal rate,   Respiratory: Normal respiratory effort.   Gastrointestinal: non distended   Musculoskeletal: Normal range of motion in all extremities. No obvious deformities or swelling.  Neurologic: Alert, oriented. Normal speech and language. No gross focal neurologic deficits are appreciated.  Skin: Skin is warm, dry. No rash noted.  Psychiatric: Mood and affect are normal.    ED Course   Procedures  Labs Reviewed - No data to display       Imaging Results    None          Medications   TETRAcaine HCl (PF) 0.5 % Drop 2 drop (2 drops Both Eyes Given 6/15/24 0701)   gentamicin 0.3 % ophthalmic solution 2 drop (2 drops Both Eyes Given 6/15/24 0812)     Medical Decision Making  Ddx- chemosis, conjunctivitis, chemical irritation of eye, ulcer, endophthalmitis     Left eye mildly injected  After application of topical tetracaine symptoms improved.  No overt ulcer    Problems Addressed:  Chemical burn of eye: acute illness or injury  Chemosis of left conjunctiva: acute illness or injury    Amount and/or Complexity of Data Reviewed  Independent Historian: spouse     Details: Notes redness in the left eye  External Data Reviewed: labs and notes.     Details: Hx of viral syndrome and orthopedic issues    Risk  OTC drugs.  Prescription drug management.                                      Clinical Impression:  Final diagnoses:  [T26.90XA] Chemical burn of eye  (Primary)  [H11.422] Chemosis of left conjunctiva          ED Disposition Condition    Discharge Stable          ED Prescriptions       Medication Sig Dispense Start Date End Date Auth. Provider    erythromycin (ROMYCIN) ophthalmic ointment Place a 1/2 inch ribbon of ointment into the lower eyelid. 3.5 g 6/15/2024 -- Feliz Leon MD          Follow-up Information       Follow up With Specialties Details Why Contact Info Additional Information    [facility] Ophthalmology Schedule an appointment as soon as possible for a visit  If symptoms worsen, For a follow up visit about today [address] [facility] [address] [phone] Turn at Entrance 1 on [facility] in [facility] and take elevators to Floor 2. Follow signs to [facility]. Take Quotations Book Elevators to Floor 3 for [address].             Feliz Leon MD  06/17/24 1878

## 2024-06-15 NOTE — Clinical Note
"Ignacio Hernandez"Sharlene was seen and treated in our emergency department on 6/15/2024.  He may return to work on 06/19/2024.       If you have any questions or concerns, please don't hesitate to call.      Feliz Leon MD"

## 2024-06-15 NOTE — DISCHARGE INSTRUCTIONS
Mr. Su,    Thank you for letting me care for you today! It was nice meeting you, and I hope you feel better soon.   If you would like access to your chart and what was done today please utilize the [facility].   Please come back to [facility] for all of your future medical needs.    Our goal in the emergency department is to always give you outstanding care and exceptional service. You may receive a survey by mail or e-mail in the next week regarding your experience in our ED. We would greatly appreciate you completing and returning the survey. Your feedback provides us with a way to recognize our staff who give very good care and it helps us learn how to improve when your experience was below our aspiration of excellence.     Sincerely,    Feliz Leon MD  Board Certified Emergency Physician

## 2024-06-15 NOTE — Clinical Note
"Ignacio Hernandez"Sharlene was seen and treated in our emergency department on 6/15/2024.  He may return to work on 06/17/2024.       If you have any questions or concerns, please don't hesitate to call.      Feliz Leon MD"

## 2024-06-19 ENCOUNTER — TELEPHONE (OUTPATIENT)
Dept: OPHTHALMOLOGY | Facility: CLINIC | Age: 39
End: 2024-06-19
Payer: MEDICAID

## 2024-07-21 ENCOUNTER — HOSPITAL ENCOUNTER (EMERGENCY)
Facility: OTHER | Age: 39
Discharge: HOME | End: 2024-07-21
Attending: EMERGENCY MEDICINE

## 2024-07-21 VITALS
BODY MASS INDEX: 34.06 KG/M2 | WEIGHT: 229.94 LBS | TEMPERATURE: 99 F | SYSTOLIC BLOOD PRESSURE: 131 MMHG | HEART RATE: 82 BPM | HEIGHT: 69 IN | OXYGEN SATURATION: 99 % | RESPIRATION RATE: 18 BRPM | DIASTOLIC BLOOD PRESSURE: 85 MMHG

## 2024-07-21 DIAGNOSIS — U07.1 COVID: Primary | ICD-10-CM

## 2024-07-21 DIAGNOSIS — U07.1 COVID-19 VIRUS DETECTED: ICD-10-CM

## 2024-07-21 LAB
ALBUMIN SERPL BCP-MCNC: 3.8 G/DL (ref 3.5–5.2)
ALP SERPL-CCNC: 78 U/L (ref 55–135)
ALT SERPL W/O P-5'-P-CCNC: 30 U/L (ref 10–44)
ANION GAP SERPL CALC-SCNC: 9 MMOL/L (ref 8–16)
AST SERPL-CCNC: 24 U/L (ref 10–40)
BASOPHILS # BLD AUTO: 0.03 K/UL (ref 0–0.2)
BASOPHILS NFR BLD: 0.5 % (ref 0–1.9)
BILIRUB SERPL-MCNC: 0.5 MG/DL (ref 0.1–1)
BUN SERPL-MCNC: 7 MG/DL (ref 6–20)
CALCIUM SERPL-MCNC: 8.8 MG/DL (ref 8.7–10.5)
CHLORIDE SERPL-SCNC: 108 MMOL/L (ref 95–110)
CO2 SERPL-SCNC: 20 MMOL/L (ref 23–29)
CREAT SERPL-MCNC: 1 MG/DL (ref 0.5–1.4)
CTP QC/QA: YES
CTP QC/QA: YES
DIFFERENTIAL METHOD BLD: ABNORMAL
EOSINOPHIL # BLD AUTO: 0 K/UL (ref 0–0.5)
EOSINOPHIL NFR BLD: 0.5 % (ref 0–8)
ERYTHROCYTE [DISTWIDTH] IN BLOOD BY AUTOMATED COUNT: 13 % (ref 11.5–14.5)
EST. GFR  (NO RACE VARIABLE): >60 ML/MIN/1.73 M^2
GLUCOSE SERPL-MCNC: 142 MG/DL (ref 70–110)
HCT VFR BLD AUTO: 42.3 % (ref 40–54)
HGB BLD-MCNC: 14 G/DL (ref 14–18)
IMM GRANULOCYTES # BLD AUTO: 0.02 K/UL (ref 0–0.04)
IMM GRANULOCYTES NFR BLD AUTO: 0.3 % (ref 0–0.5)
LIPASE SERPL-CCNC: 27 U/L (ref 4–60)
LYMPHOCYTES # BLD AUTO: 0.8 K/UL (ref 1–4.8)
LYMPHOCYTES NFR BLD: 13.9 % (ref 18–48)
MCH RBC QN AUTO: 31 PG (ref 27–31)
MCHC RBC AUTO-ENTMCNC: 33.1 G/DL (ref 32–36)
MCV RBC AUTO: 94 FL (ref 82–98)
MONOCYTES # BLD AUTO: 0.7 K/UL (ref 0.3–1)
MONOCYTES NFR BLD: 10.9 % (ref 4–15)
NEUTROPHILS # BLD AUTO: 4.5 K/UL (ref 1.8–7.7)
NEUTROPHILS NFR BLD: 73.9 % (ref 38–73)
NRBC BLD-RTO: 0 /100 WBC
PLATELET # BLD AUTO: 147 K/UL (ref 150–450)
PMV BLD AUTO: 10.9 FL (ref 9.2–12.9)
POC MOLECULAR INFLUENZA A AGN: NEGATIVE
POC MOLECULAR INFLUENZA B AGN: NEGATIVE
POTASSIUM SERPL-SCNC: 4.2 MMOL/L (ref 3.5–5.1)
PROT SERPL-MCNC: 6.7 G/DL (ref 6–8.4)
RBC # BLD AUTO: 4.52 M/UL (ref 4.6–6.2)
SARS-COV-2 RDRP RESP QL NAA+PROBE: POSITIVE
SODIUM SERPL-SCNC: 137 MMOL/L (ref 136–145)
WBC # BLD AUTO: 6.05 K/UL (ref 3.9–12.7)

## 2024-07-21 PROCEDURE — 96374 THER/PROPH/DIAG INJ IV PUSH: CPT

## 2024-07-21 PROCEDURE — 25000003 PHARM REV CODE 250

## 2024-07-21 PROCEDURE — 96361 HYDRATE IV INFUSION ADD-ON: CPT

## 2024-07-21 PROCEDURE — 85025 COMPLETE CBC W/AUTO DIFF WBC: CPT

## 2024-07-21 PROCEDURE — 63600175 PHARM REV CODE 636 W HCPCS

## 2024-07-21 PROCEDURE — 80053 COMPREHEN METABOLIC PANEL: CPT

## 2024-07-21 PROCEDURE — 87635 SARS-COV-2 COVID-19 AMP PRB: CPT

## 2024-07-21 PROCEDURE — 83690 ASSAY OF LIPASE: CPT

## 2024-07-21 PROCEDURE — 99284 EMERGENCY DEPT VISIT MOD MDM: CPT | Mod: 25

## 2024-07-21 RX ORDER — ONDANSETRON HYDROCHLORIDE 2 MG/ML
4 INJECTION, SOLUTION INTRAVENOUS
Status: COMPLETED | OUTPATIENT
Start: 2024-07-21 | End: 2024-07-21

## 2024-07-21 RX ORDER — ACETAMINOPHEN 500 MG
1000 TABLET ORAL
Status: COMPLETED | OUTPATIENT
Start: 2024-07-21 | End: 2024-07-21

## 2024-07-21 RX ADMIN — ACETAMINOPHEN 1000 MG: 500 TABLET ORAL at 10:07

## 2024-07-21 RX ADMIN — ONDANSETRON 4 MG: 2 INJECTION INTRAMUSCULAR; INTRAVENOUS at 10:07

## 2024-07-21 RX ADMIN — SODIUM CHLORIDE 500 ML: 9 INJECTION, SOLUTION INTRAVENOUS at 10:07

## 2024-07-21 NOTE — DISCHARGE INSTRUCTIONS
Your evaluation in the ED reveals you are covid positive. Continue to take over the counter medications including tylenol and ibuprofen for symptom control. Follow up with your primary care doctor. Return to the ED for new or worsening symptoms including difficulty breathing

## 2024-07-21 NOTE — ED TRIAGE NOTES
Pt complains of diarrhea x2 days accompanied with nausea. Pt states he is able to keep food/liquids down. Pt denies vomiting. Pt complains of all over body aches, rates pain 6/10. Pt complains of chills and night sweats. Pt wife at bedside.  
Jose Flores)  Surgery  [address], [address]  [place] [address]  Phone: [phone]  Fax: [phone]  Follow Up Time:

## 2024-07-21 NOTE — ED PROVIDER NOTES
Encounter Date: 7/21/2024       History     Chief Complaint   Patient presents with    Diarrhea     Pt. Complains of diarrhea,chills and body aches x 1 day. Pt. Denies nausea/vomiting. Temp in triage 99.4. Pt. Is alert and ABC's are intact.     Pt is a 39 year old male with PMHx significant for GERD who presents for evaluation of generalized body aches, which began 1 day ago. He notes an associated nausea, diarrhea, and chills. Denies chest pain, shortness of breath, abdominal pain, numbness, or weakness. Hx of similar symptoms with previous covid diagnosis.     The history is provided by the patient.     Review of patient's allergies indicates:   Allergen Reactions    Penicillins Hives     Past Medical History:   Diagnosis Date    GERD (gastroesophageal reflux disease)     Hemorrhoids     Internal hemorrhoids      History reviewed. No pertinent surgical history.  Family History   Problem Relation Name Age of Onset    Heart disease Mother      Heart disease Father       Social History     Tobacco Use    Smoking status: Former     Types: Cigars    Smokeless tobacco: Current    Tobacco comments:     doesnt want to quit   Substance Use Topics    Alcohol use: Yes     Comment: occasional    Drug use: No     Review of Systems    Physical Exam     Initial Vitals [07/21/24 0956]   BP Pulse Resp Temp SpO2   (!) 149/88 85 18 99.4 °F (37.4 °C) 98 %      MAP       --         Physical Exam    Nursing note and vitals reviewed.  Constitutional: He appears well-developed and well-nourished. No distress.   HENT:   Head: Normocephalic and atraumatic.   Eyes: EOM are normal.   Neck: Neck supple.   Cardiovascular:  Normal rate, regular rhythm and intact distal pulses.           No murmur heard.  Pulmonary/Chest: Breath sounds normal. No stridor. No respiratory distress. He has no wheezes. He has no rales.   Abdominal: Abdomen is soft. He exhibits no distension. There is no abdominal tenderness.   Musculoskeletal:         General: No  edema. Normal range of motion.      Cervical back: Neck supple.     Neurological: He is alert and oriented to person, place, and time.   Skin: Skin is warm and dry.         ED Course   Procedures  Labs Reviewed   CBC W/ AUTO DIFFERENTIAL - Abnormal       Result Value    WBC 6.05      RBC 4.52 (*)     Hemoglobin 14.0      Hematocrit 42.3      MCV 94      MCH 31.0      MCHC 33.1      RDW 13.0      Platelets 147 (*)     MPV 10.9      Immature Granulocytes 0.3      Gran # (ANC) 4.5      Immature Grans (Abs) 0.02      Lymph # 0.8 (*)     Mono # 0.7      Eos # 0.0      Baso # 0.03      nRBC 0      Gran % 73.9 (*)     Lymph % 13.9 (*)     Mono % 10.9      Eosinophil % 0.5      Basophil % 0.5      Differential Method Automated     COMPREHENSIVE METABOLIC PANEL - Abnormal    Sodium 137      Potassium 4.2      Chloride 108      CO2 20 (*)     Glucose 142 (*)     BUN 7      Creatinine 1.0      Calcium 8.8      Total Protein 6.7      Albumin 3.8      Total Bilirubin 0.5      Alkaline Phosphatase 78      AST 24      ALT 30      eGFR >60      Anion Gap 9     SARS-COV-2 RDRP GENE - Abnormal    POC Rapid COVID Positive (*)      Acceptable Yes     LIPASE    Lipase 27     SARS-COV-2 RNA AMPLIFICATION, QUAL   POCT INFLUENZA A/B MOLECULAR    POC Molecular Influenza A Ag Negative      POC Molecular Influenza B Ag Negative       Acceptable Yes            Imaging Results    None          Medications   sodium chloride 0.9% bolus 500 mL 500 mL (500 mLs Intravenous New Bag 7/21/24 1025)   acetaminophen tablet 1,000 mg (1,000 mg Oral Given 7/21/24 1026)   ondansetron injection 4 mg (4 mg Intravenous Given 7/21/24 1025)     Medical Decision Making  Pt presents for body aches, nausea, and diarrhea. Ddx: covid, flu, anemia, electrolyte abnormality. Pt well appearing and in no acute distress. Afebrile and hemodynamically stable. Benign abdominal exam and without urinary symptoms. Will defer on abdominal imaging and  urinalysis. Labs without leucocytosis, anemia, or electrolyte abnormality. Covid positive. Pt without shortness of breath or respiratory symptoms. Plan to discharge to home. Return precautions advised     Amount and/or Complexity of Data Reviewed  Labs: ordered.    Risk  OTC drugs.  Prescription drug management.                                      Clinical Impression:  Final diagnoses:  [U07.1] COVID (Primary)          ED Disposition Condition    Discharge Stable          ED Prescriptions    None       Follow-up Information       Follow up With Specialties Details Why Contact Info    your primary care doctor  In 1 week               Justino Cummins Jr., MD  Resident  07/21/24 8044

## 2024-07-21 NOTE — Clinical Note
"Ignacio Hernandez"Sharlene was seen and treated in our emergency department on 7/21/2024.  He may return to work on 07/25/2024.       If you have any questions or concerns, please don't hesitate to call.      Justino Cummins Jr., MD"